# Patient Record
Sex: FEMALE | Race: WHITE | Employment: OTHER | URBAN - METROPOLITAN AREA
[De-identification: names, ages, dates, MRNs, and addresses within clinical notes are randomized per-mention and may not be internally consistent; named-entity substitution may affect disease eponyms.]

---

## 2017-10-23 PROBLEM — I10 ESSENTIAL HYPERTENSION: Chronic | Status: ACTIVE | Noted: 2017-10-23

## 2017-10-23 PROBLEM — Z96.611 S/P REVERSE TOTAL SHOULDER ARTHROPLASTY, RIGHT: Status: ACTIVE | Noted: 2017-10-23

## 2017-10-23 PROBLEM — L40.9 PSORIASIS: Chronic | Status: ACTIVE | Noted: 2017-10-23

## 2017-10-24 PROBLEM — D62 ACUTE BLOOD LOSS ANEMIA: Status: ACTIVE | Noted: 2017-10-24

## 2017-11-07 PROBLEM — I26.99 ACUTE PULMONARY EMBOLISM (HCC): Status: ACTIVE | Noted: 2017-11-07

## 2017-11-07 PROBLEM — I48.91 NEW ONSET ATRIAL FIBRILLATION (HCC): Status: ACTIVE | Noted: 2017-11-07

## 2017-11-09 PROBLEM — K92.2 GI BLEED: Status: ACTIVE | Noted: 2017-11-09

## 2017-12-03 PROBLEM — I82.A11 DVT OF AXILLARY VEIN, ACUTE RIGHT (HCC): Status: ACTIVE | Noted: 2017-12-03

## 2017-12-04 PROBLEM — K26.9 DUODENAL ULCER: Status: ACTIVE | Noted: 2017-11-09

## 2017-12-04 PROBLEM — I50.31 ACUTE DIASTOLIC CONGESTIVE HEART FAILURE (HCC): Status: ACTIVE | Noted: 2017-12-03

## 2017-12-04 PROBLEM — K26.9 DUODENAL ULCER: Chronic | Status: ACTIVE | Noted: 2017-11-09

## 2017-12-04 PROBLEM — M79.89 LEG SWELLING: Status: ACTIVE | Noted: 2017-12-04

## 2017-12-04 PROBLEM — D62 ACUTE BLOOD LOSS ANEMIA: Status: RESOLVED | Noted: 2017-10-24 | Resolved: 2017-12-04

## 2017-12-04 PROBLEM — D50.0 ANEMIA DUE TO BLOOD LOSS: Chronic | Status: ACTIVE | Noted: 2017-11-09

## 2017-12-04 PROBLEM — E53.8 B12 DEFICIENCY: Chronic | Status: ACTIVE | Noted: 2017-12-03

## 2017-12-04 PROBLEM — Z86.718 HISTORY OF DVT OF LOWER EXTREMITY: Chronic | Status: ACTIVE | Noted: 2017-11-07

## 2017-12-04 PROBLEM — D56.3 THALASSEMIA TRAIT: Chronic | Status: ACTIVE | Noted: 2017-12-03

## 2017-12-04 PROBLEM — I27.20 PULMONARY HYPERTENSION (HCC): Status: ACTIVE | Noted: 2017-12-04

## 2017-12-04 PROBLEM — Z95.828 S/P IVC FILTER: Chronic | Status: ACTIVE | Noted: 2017-11-09

## 2017-12-04 PROBLEM — K92.2 GASTROINTESTINAL HEMORRHAGE: Status: RESOLVED | Noted: 2017-11-09 | Resolved: 2017-12-04

## 2017-12-04 PROBLEM — Z86.711 HISTORY OF PULMONARY EMBOLISM: Chronic | Status: ACTIVE | Noted: 2017-11-07

## 2018-01-01 ENCOUNTER — TELEPHONE (OUTPATIENT)
Dept: SURGERY | Age: 78
End: 2018-01-01

## 2018-01-01 ENCOUNTER — HOSPITAL ENCOUNTER (EMERGENCY)
Age: 78
Discharge: HOME | End: 2018-07-24
Attending: EMERGENCY MEDICINE
Payer: MEDICARE

## 2018-01-01 ENCOUNTER — HOSPITAL ENCOUNTER (INPATIENT)
Age: 78
LOS: 2 days | Discharge: HOME | DRG: 394 | End: 2018-05-12
Attending: EMERGENCY MEDICINE | Admitting: INTERNAL MEDICINE
Payer: MEDICARE

## 2018-01-01 ENCOUNTER — HOSPITAL ENCOUNTER (OUTPATIENT)
Age: 78
Discharge: HOME | End: 2018-08-26

## 2018-01-01 ENCOUNTER — HOSPITAL ENCOUNTER (OUTPATIENT)
Dept: NUCLEAR MEDICINE | Age: 78
Discharge: HOME | End: 2018-12-29
Payer: MEDICARE

## 2018-01-01 ENCOUNTER — HOSPITAL ENCOUNTER (OUTPATIENT)
Dept: ULTRASOUND IMAGING | Age: 78
Discharge: HOME | End: 2018-07-26
Payer: MEDICARE

## 2018-01-01 ENCOUNTER — HOSPITAL ENCOUNTER (OUTPATIENT)
Age: 78
Setting detail: OUTPATIENT SURGERY
Discharge: HOME | End: 2018-12-13
Attending: SURGERY | Admitting: SURGERY
Payer: MEDICARE

## 2018-01-01 ENCOUNTER — APPOINTMENT (OUTPATIENT)
Dept: ULTRASOUND IMAGING | Age: 78
DRG: 394 | End: 2018-01-01
Payer: MEDICARE

## 2018-01-01 ENCOUNTER — OFFICE VISIT (OUTPATIENT)
Dept: SURGERY | Age: 78
End: 2018-01-01
Payer: MEDICARE

## 2018-01-01 ENCOUNTER — HOSPITAL ENCOUNTER (OUTPATIENT)
Dept: NUCLEAR MEDICINE | Age: 78
Discharge: HOME | End: 2018-12-30
Payer: MEDICARE

## 2018-01-01 ENCOUNTER — OFFICE VISIT (OUTPATIENT)
Dept: ORTHOPEDIC SURGERY | Age: 78
End: 2018-01-01
Payer: MEDICARE

## 2018-01-01 ENCOUNTER — ANESTHESIA (OUTPATIENT)
Dept: ENDOSCOPY | Age: 78
End: 2018-01-01
Payer: MEDICARE

## 2018-01-01 ENCOUNTER — ANESTHESIA EVENT (OUTPATIENT)
Dept: ENDOSCOPY | Age: 78
End: 2018-01-01
Payer: MEDICARE

## 2018-01-01 ENCOUNTER — TELEPHONE (OUTPATIENT)
Dept: ADMINISTRATIVE | Age: 78
End: 2018-01-01

## 2018-01-01 ENCOUNTER — HOSPITAL ENCOUNTER (OUTPATIENT)
Age: 78
Discharge: HOME | End: 2018-12-17
Payer: MEDICARE

## 2018-01-01 ENCOUNTER — HOSPITAL ENCOUNTER (OUTPATIENT)
Dept: CT IMAGING | Age: 78
Discharge: HOME | End: 2018-12-19
Payer: MEDICARE

## 2018-01-01 VITALS
BODY MASS INDEX: 23.6 KG/M2 | SYSTOLIC BLOOD PRESSURE: 100 MMHG | DIASTOLIC BLOOD PRESSURE: 55 MMHG | OXYGEN SATURATION: 98 % | TEMPERATURE: 98 F | WEIGHT: 125 LBS | HEIGHT: 61 IN | HEART RATE: 78 BPM | RESPIRATION RATE: 15 BRPM

## 2018-01-01 VITALS — DIASTOLIC BLOOD PRESSURE: 66 MMHG | SYSTOLIC BLOOD PRESSURE: 106 MMHG | OXYGEN SATURATION: 100 %

## 2018-01-01 VITALS
HEART RATE: 65 BPM | WEIGHT: 134.8 LBS | HEIGHT: 61 IN | SYSTOLIC BLOOD PRESSURE: 100 MMHG | TEMPERATURE: 98.3 F | DIASTOLIC BLOOD PRESSURE: 78 MMHG | BODY MASS INDEX: 25.45 KG/M2

## 2018-01-01 VITALS
HEART RATE: 80 BPM | BODY MASS INDEX: 28.37 KG/M2 | SYSTOLIC BLOOD PRESSURE: 108 MMHG | DIASTOLIC BLOOD PRESSURE: 60 MMHG | TEMPERATURE: 98 F | HEIGHT: 61 IN | RESPIRATION RATE: 18 BRPM | OXYGEN SATURATION: 98 % | WEIGHT: 150.3 LBS

## 2018-01-01 VITALS
SYSTOLIC BLOOD PRESSURE: 89 MMHG | WEIGHT: 125 LBS | HEART RATE: 88 BPM | OXYGEN SATURATION: 98 % | HEIGHT: 61 IN | TEMPERATURE: 98.6 F | DIASTOLIC BLOOD PRESSURE: 62 MMHG | BODY MASS INDEX: 23.6 KG/M2

## 2018-01-01 VITALS
DIASTOLIC BLOOD PRESSURE: 79 MMHG | HEIGHT: 61 IN | WEIGHT: 136 LBS | BODY MASS INDEX: 25.68 KG/M2 | TEMPERATURE: 98.1 F | SYSTOLIC BLOOD PRESSURE: 115 MMHG | HEART RATE: 93 BPM

## 2018-01-01 VITALS
WEIGHT: 145 LBS | BODY MASS INDEX: 27.38 KG/M2 | RESPIRATION RATE: 16 BRPM | HEIGHT: 61 IN | DIASTOLIC BLOOD PRESSURE: 73 MMHG | OXYGEN SATURATION: 96 % | TEMPERATURE: 98.3 F | HEART RATE: 80 BPM | SYSTOLIC BLOOD PRESSURE: 116 MMHG

## 2018-01-01 VITALS
SYSTOLIC BLOOD PRESSURE: 107 MMHG | BODY MASS INDEX: 23.6 KG/M2 | WEIGHT: 125 LBS | DIASTOLIC BLOOD PRESSURE: 77 MMHG | HEIGHT: 61 IN | HEART RATE: 96 BPM | TEMPERATURE: 98 F

## 2018-01-01 VITALS
HEART RATE: 90 BPM | BODY MASS INDEX: 27.38 KG/M2 | OXYGEN SATURATION: 98 % | TEMPERATURE: 97.6 F | WEIGHT: 145 LBS | HEIGHT: 61 IN | DIASTOLIC BLOOD PRESSURE: 80 MMHG | SYSTOLIC BLOOD PRESSURE: 120 MMHG

## 2018-01-01 DIAGNOSIS — M19.011 PRIMARY OSTEOARTHRITIS OF RIGHT SHOULDER: ICD-10-CM

## 2018-01-01 DIAGNOSIS — M25.511 CHRONIC RIGHT SHOULDER PAIN: ICD-10-CM

## 2018-01-01 DIAGNOSIS — D3A.8 NEUROENDOCRINE TUMOR: ICD-10-CM

## 2018-01-01 DIAGNOSIS — Z85.038 HISTORY OF COLON CANCER, STAGE I: Primary | ICD-10-CM

## 2018-01-01 DIAGNOSIS — R19.7 DIARRHEA, UNSPECIFIED TYPE: ICD-10-CM

## 2018-01-01 DIAGNOSIS — D3A.010 BENIGN CARCINOID TUMOR OF DUODENUM: ICD-10-CM

## 2018-01-01 DIAGNOSIS — R60.9 EDEMA, UNSPECIFIED TYPE: ICD-10-CM

## 2018-01-01 DIAGNOSIS — Z96.611 S/P REVERSE TOTAL SHOULDER ARTHROPLASTY, RIGHT: Primary | ICD-10-CM

## 2018-01-01 DIAGNOSIS — K74.60 HEPATIC CIRRHOSIS, UNSPECIFIED HEPATIC CIRRHOSIS TYPE, UNSPECIFIED WHETHER ASCITES PRESENT (HCC): ICD-10-CM

## 2018-01-01 DIAGNOSIS — G89.29 CHRONIC RIGHT SHOULDER PAIN: ICD-10-CM

## 2018-01-01 DIAGNOSIS — D3A.00 CARCINOID TUMOR: ICD-10-CM

## 2018-01-01 DIAGNOSIS — I82.402 DEEP VEIN THROMBOSIS (DVT) OF LEFT LOWER EXTREMITY, UNSPECIFIED CHRONICITY, UNSPECIFIED VEIN (HCC): Primary | ICD-10-CM

## 2018-01-01 DIAGNOSIS — K62.5 RECTAL BLEEDING: Primary | ICD-10-CM

## 2018-01-01 DIAGNOSIS — C18.3 MALIGNANT NEOPLASM OF HEPATIC FLEXURE (HCC): ICD-10-CM

## 2018-01-01 DIAGNOSIS — Z85.038 HISTORY OF COLON CANCER, STAGE I: ICD-10-CM

## 2018-01-01 DIAGNOSIS — C18.3 CARCINOMA OF HEPATIC FLEXURE (HCC): ICD-10-CM

## 2018-01-01 DIAGNOSIS — R79.89 LIVER FUNCTION TEST ABNORMALITY: ICD-10-CM

## 2018-01-01 LAB
ABO/RH: NORMAL
ALBUMIN SERPL-MCNC: 1.9 G/DL (ref 3.5–5.2)
ALBUMIN SERPL-MCNC: 2 G/DL (ref 3.5–5.2)
ALBUMIN SERPL-MCNC: 2.2 G/DL (ref 3.5–5.2)
ALBUMIN SERPL-MCNC: 2.9 G/DL (ref 3.5–5.2)
ALP BLD-CCNC: 108 U/L (ref 35–104)
ALP BLD-CCNC: 120 U/L (ref 35–104)
ALP BLD-CCNC: 121 U/L (ref 35–104)
ALP BLD-CCNC: 239 U/L (ref 35–104)
ALT SERPL-CCNC: 13 U/L (ref 0–32)
ALT SERPL-CCNC: 14 U/L (ref 0–32)
ALT SERPL-CCNC: 18 U/L (ref 0–32)
ALT SERPL-CCNC: 26 U/L (ref 0–32)
ANION GAP SERPL CALCULATED.3IONS-SCNC: 10 MMOL/L (ref 7–16)
ANION GAP SERPL CALCULATED.3IONS-SCNC: 11 MMOL/L (ref 7–16)
ANION GAP SERPL CALCULATED.3IONS-SCNC: 11 MMOL/L (ref 7–16)
ANION GAP SERPL CALCULATED.3IONS-SCNC: 12 MMOL/L (ref 7–16)
ANTIBODY SCREEN: NORMAL
APTT: 21.4 SEC (ref 24.5–35.1)
APTT: 26.4 SEC (ref 24.5–35.1)
AST SERPL-CCNC: 19 U/L (ref 0–31)
AST SERPL-CCNC: 20 U/L (ref 0–31)
AST SERPL-CCNC: 45 U/L (ref 0–31)
AST SERPL-CCNC: 59 U/L (ref 0–31)
BASOPHILS ABSOLUTE: 0.02 E9/L (ref 0–0.2)
BASOPHILS ABSOLUTE: 0.03 E9/L (ref 0–0.2)
BASOPHILS ABSOLUTE: 0.03 E9/L (ref 0–0.2)
BASOPHILS ABSOLUTE: 0.05 E9/L (ref 0–0.2)
BASOPHILS RELATIVE PERCENT: 0.3 % (ref 0–2)
BASOPHILS RELATIVE PERCENT: 0.4 % (ref 0–2)
BASOPHILS RELATIVE PERCENT: 0.4 % (ref 0–2)
BASOPHILS RELATIVE PERCENT: 0.8 % (ref 0–2)
BILIRUB SERPL-MCNC: 0.6 MG/DL (ref 0–1.2)
BILIRUB SERPL-MCNC: 0.6 MG/DL (ref 0–1.2)
BILIRUB SERPL-MCNC: 0.7 MG/DL (ref 0–1.2)
BILIRUB SERPL-MCNC: 0.9 MG/DL (ref 0–1.2)
BLOOD BANK DISPENSE STATUS: NORMAL
BLOOD BANK DISPENSE STATUS: NORMAL
BLOOD BANK PRODUCT CODE: NORMAL
BLOOD BANK PRODUCT CODE: NORMAL
BPU ID: NORMAL
BPU ID: NORMAL
BUN BLDV-MCNC: 11 MG/DL (ref 8–23)
BUN BLDV-MCNC: 12 MG/DL (ref 8–23)
BUN BLDV-MCNC: 13 MG/DL (ref 8–23)
BUN BLDV-MCNC: 17 MG/DL (ref 8–23)
BUN BLDV-MCNC: 8 MG/DL (ref 8–23)
CALCIUM SERPL-MCNC: 7.4 MG/DL (ref 8.6–10.2)
CALCIUM SERPL-MCNC: 7.5 MG/DL (ref 8.6–10.2)
CALCIUM SERPL-MCNC: 7.5 MG/DL (ref 8.6–10.2)
CALCIUM SERPL-MCNC: 8.1 MG/DL (ref 8.6–10.2)
CHLORIDE BLD-SCNC: 104 MMOL/L (ref 98–107)
CHLORIDE BLD-SCNC: 106 MMOL/L (ref 98–107)
CHLORIDE BLD-SCNC: 106 MMOL/L (ref 98–107)
CHLORIDE BLD-SCNC: 108 MMOL/L (ref 98–107)
CO2: 23 MMOL/L (ref 22–29)
CO2: 24 MMOL/L (ref 22–29)
CO2: 24 MMOL/L (ref 22–29)
CO2: 25 MMOL/L (ref 22–29)
CREAT SERPL-MCNC: 0.5 MG/DL (ref 0.5–1)
CREAT SERPL-MCNC: 0.5 MG/DL (ref 0.5–1)
CREAT SERPL-MCNC: 0.6 MG/DL (ref 0.5–1)
DESCRIPTION BLOOD BANK: NORMAL
DESCRIPTION BLOOD BANK: NORMAL
EOSINOPHILS ABSOLUTE: 0.04 E9/L (ref 0.05–0.5)
EOSINOPHILS ABSOLUTE: 0.05 E9/L (ref 0.05–0.5)
EOSINOPHILS ABSOLUTE: 0.07 E9/L (ref 0.05–0.5)
EOSINOPHILS ABSOLUTE: 0.11 E9/L (ref 0.05–0.5)
EOSINOPHILS RELATIVE PERCENT: 0.6 % (ref 0–6)
EOSINOPHILS RELATIVE PERCENT: 0.8 % (ref 0–6)
EOSINOPHILS RELATIVE PERCENT: 1.2 % (ref 0–6)
EOSINOPHILS RELATIVE PERCENT: 1.6 % (ref 0–6)
FECAL NEUTRAL FAT: NORMAL
FECAL SPLIT FATS: NORMAL
FERRITIN: 175 NG/ML
FOLATE: 17.3 NG/ML (ref 4.8–24.2)
GFR AFRICAN AMERICAN: >60
GFR NON-AFRICAN AMERICAN: >60 ML/MIN/1.73
GIARDIA ANTIGEN STOOL: NORMAL
GLUCOSE BLD-MCNC: 74 MG/DL (ref 74–109)
GLUCOSE BLD-MCNC: 75 MG/DL (ref 74–109)
GLUCOSE BLD-MCNC: 91 MG/DL (ref 74–109)
GLUCOSE BLD-MCNC: 95 MG/DL (ref 74–109)
HCT VFR BLD CALC: 25.3 % (ref 34–48)
HCT VFR BLD CALC: 27.3 % (ref 34–48)
HCT VFR BLD CALC: 27.3 % (ref 34–48)
HCT VFR BLD CALC: 27.8 % (ref 34–48)
HCT VFR BLD CALC: 30.5 % (ref 34–48)
HCT VFR BLD CALC: 32.7 % (ref 34–48)
HCT VFR BLD CALC: 32.8 % (ref 34–48)
HCT VFR BLD CALC: 35.5 % (ref 34–48)
HEMOGLOBIN: 10 G/DL (ref 11.5–15.5)
HEMOGLOBIN: 10.5 G/DL (ref 11.5–15.5)
HEMOGLOBIN: 10.8 G/DL (ref 11.5–15.5)
HEMOGLOBIN: 11.6 G/DL (ref 11.5–15.5)
HEMOGLOBIN: 8.4 G/DL (ref 11.5–15.5)
HEMOGLOBIN: 9 G/DL (ref 11.5–15.5)
HEMOGLOBIN: 9.1 G/DL (ref 11.5–15.5)
HEMOGLOBIN: 9.1 G/DL (ref 11.5–15.5)
IMMATURE GRANULOCYTES #: 0.01 E9/L
IMMATURE GRANULOCYTES #: 0.01 E9/L
IMMATURE GRANULOCYTES #: 0.02 E9/L
IMMATURE GRANULOCYTES #: 0.03 E9/L
IMMATURE GRANULOCYTES %: 0.1 % (ref 0–5)
IMMATURE GRANULOCYTES %: 0.2 % (ref 0–5)
IMMATURE GRANULOCYTES %: 0.3 % (ref 0–5)
IMMATURE GRANULOCYTES %: 0.5 % (ref 0–5)
IMMATURE RETIC FRACT: 28.5 % (ref 3–15.9)
INR BLD: 1.1
INR BLD: 1.4
IRON SATURATION: 34 % (ref 15–50)
IRON: 55 MCG/DL (ref 37–145)
LYMPHOCYTES ABSOLUTE: 2 E9/L (ref 1.5–4)
LYMPHOCYTES ABSOLUTE: 2.15 E9/L (ref 1.5–4)
LYMPHOCYTES ABSOLUTE: 2.25 E9/L (ref 1.5–4)
LYMPHOCYTES ABSOLUTE: 2.74 E9/L (ref 1.5–4)
LYMPHOCYTES RELATIVE PERCENT: 28.8 % (ref 20–42)
LYMPHOCYTES RELATIVE PERCENT: 34.2 % (ref 20–42)
LYMPHOCYTES RELATIVE PERCENT: 35.6 % (ref 20–42)
LYMPHOCYTES RELATIVE PERCENT: 40.5 % (ref 20–42)
MAGNESIUM: 1.4 MG/DL (ref 1.6–2.6)
MCH RBC QN AUTO: 22.3 PG (ref 26–35)
MCH RBC QN AUTO: 22.5 PG (ref 26–35)
MCH RBC QN AUTO: 22.5 PG (ref 26–35)
MCH RBC QN AUTO: 22.8 PG (ref 26–35)
MCHC RBC AUTO-ENTMCNC: 32 % (ref 32–34.5)
MCHC RBC AUTO-ENTMCNC: 32.7 % (ref 32–34.5)
MCHC RBC AUTO-ENTMCNC: 33.2 % (ref 32–34.5)
MCHC RBC AUTO-ENTMCNC: 33.3 % (ref 32–34.5)
MCV RBC AUTO: 67.4 FL (ref 80–99.9)
MCV RBC AUTO: 67.8 FL (ref 80–99.9)
MCV RBC AUTO: 68.1 FL (ref 80–99.9)
MCV RBC AUTO: 71.3 FL (ref 80–99.9)
MONOCYTES ABSOLUTE: 0.37 E9/L (ref 0.1–0.95)
MONOCYTES ABSOLUTE: 0.41 E9/L (ref 0.1–0.95)
MONOCYTES ABSOLUTE: 0.48 E9/L (ref 0.1–0.95)
MONOCYTES ABSOLUTE: 0.53 E9/L (ref 0.1–0.95)
MONOCYTES RELATIVE PERCENT: 6.1 % (ref 2–12)
MONOCYTES RELATIVE PERCENT: 6.2 % (ref 2–12)
MONOCYTES RELATIVE PERCENT: 7.1 % (ref 2–12)
MONOCYTES RELATIVE PERCENT: 7.6 % (ref 2–12)
NEUTROPHILS ABSOLUTE: 3.4 E9/L (ref 1.8–7.3)
NEUTROPHILS ABSOLUTE: 3.42 E9/L (ref 1.8–7.3)
NEUTROPHILS ABSOLUTE: 3.78 E9/L (ref 1.8–7.3)
NEUTROPHILS ABSOLUTE: 4.32 E9/L (ref 1.8–7.3)
NEUTROPHILS RELATIVE PERCENT: 50.3 % (ref 43–80)
NEUTROPHILS RELATIVE PERCENT: 56.6 % (ref 43–80)
NEUTROPHILS RELATIVE PERCENT: 57.5 % (ref 43–80)
NEUTROPHILS RELATIVE PERCENT: 62.3 % (ref 43–80)
PDW BLD-RTO: 18.2 FL (ref 11.5–15)
PDW BLD-RTO: 18.4 FL (ref 11.5–15)
PDW BLD-RTO: 18.5 FL (ref 11.5–15)
PDW BLD-RTO: 19.4 FL (ref 11.5–15)
PLATELET # BLD: 155 E9/L (ref 130–450)
PLATELET # BLD: 156 E9/L (ref 130–450)
PLATELET # BLD: 173 E9/L (ref 130–450)
PLATELET # BLD: 185 E9/L (ref 130–450)
PMV BLD AUTO: 11.1 FL (ref 7–12)
PMV BLD AUTO: 11.3 FL (ref 7–12)
PMV BLD AUTO: 11.4 FL (ref 7–12)
PMV BLD AUTO: ABNORMAL FL (ref 7–12)
POTASSIUM REFLEX MAGNESIUM: 2.7 MMOL/L (ref 3.5–5)
POTASSIUM REFLEX MAGNESIUM: 3.6 MMOL/L (ref 3.5–5)
POTASSIUM SERPL-SCNC: 4.7 MMOL/L (ref 3.5–5)
POTASSIUM SERPL-SCNC: 4.9 MMOL/L (ref 3.5–5)
PROTHROMBIN TIME: 12.7 SEC (ref 9.3–12.4)
PROTHROMBIN TIME: 16.1 SEC (ref 9.3–12.4)
RBC # BLD: 3.73 E12/L (ref 3.5–5.5)
RBC # BLD: 4.05 E12/L (ref 3.5–5.5)
RBC # BLD: 4.08 E12/L (ref 3.5–5.5)
RBC # BLD: 4.6 E12/L (ref 3.5–5.5)
RETIC HGB EQUIVALENT: 25.4 PG (ref 28.2–36.6)
RETICULOCYTE ABSOLUTE COUNT: 0.09 E12/L
RETICULOCYTE COUNT PCT: 2.1 % (ref 0.4–1.9)
ROTAVIRUS ANTIGEN: NORMAL
SODIUM BLD-SCNC: 138 MMOL/L (ref 132–146)
SODIUM BLD-SCNC: 140 MMOL/L (ref 132–146)
SODIUM BLD-SCNC: 142 MMOL/L (ref 132–146)
SODIUM BLD-SCNC: 144 MMOL/L (ref 132–146)
TOTAL IRON BINDING CAPACITY: 160 MCG/DL (ref 250–450)
TOTAL PROTEIN: 3.9 G/DL (ref 6.4–8.3)
TOTAL PROTEIN: 4.2 G/DL (ref 6.4–8.3)
TOTAL PROTEIN: 4.6 G/DL (ref 6.4–8.3)
TOTAL PROTEIN: 5.6 G/DL (ref 6.4–8.3)
VITAMIN B-12: 374 PG/ML (ref 211–946)
WBC # BLD: 6 E9/L (ref 4.5–11.5)
WBC # BLD: 6.6 E9/L (ref 4.5–11.5)
WBC # BLD: 6.8 E9/L (ref 4.5–11.5)
WBC # BLD: 6.9 E9/L (ref 4.5–11.5)
WHITE BLOOD CELLS (WBC), STOOL: NORMAL

## 2018-01-01 PROCEDURE — 3700000000 HC ANESTHESIA ATTENDED CARE: Performed by: SURGERY

## 2018-01-01 PROCEDURE — 85014 HEMATOCRIT: CPT

## 2018-01-01 PROCEDURE — 7100000010 HC PHASE II RECOVERY - FIRST 15 MIN: Performed by: SURGERY

## 2018-01-01 PROCEDURE — 93970 EXTREMITY STUDY: CPT

## 2018-01-01 PROCEDURE — 85730 THROMBOPLASTIN TIME PARTIAL: CPT

## 2018-01-01 PROCEDURE — 99222 1ST HOSP IP/OBS MODERATE 55: CPT | Performed by: SURGERY

## 2018-01-01 PROCEDURE — 85018 HEMOGLOBIN: CPT

## 2018-01-01 PROCEDURE — G0105 COLORECTAL SCRN; HI RISK IND: HCPCS | Performed by: SURGERY

## 2018-01-01 PROCEDURE — 99213 OFFICE O/P EST LOW 20 MIN: CPT | Performed by: ORTHOPAEDIC SURGERY

## 2018-01-01 PROCEDURE — 2709999900 HC NON-CHARGEABLE SUPPLY: Performed by: SURGERY

## 2018-01-01 PROCEDURE — G8987 SELF CARE CURRENT STATUS: HCPCS

## 2018-01-01 PROCEDURE — 89055 LEUKOCYTE ASSESSMENT FECAL: CPT

## 2018-01-01 PROCEDURE — 85025 COMPLETE CBC W/AUTO DIFF WBC: CPT

## 2018-01-01 PROCEDURE — 85610 PROTHROMBIN TIME: CPT

## 2018-01-01 PROCEDURE — 86901 BLOOD TYPING SEROLOGIC RH(D): CPT

## 2018-01-01 PROCEDURE — 3700000001 HC ADD 15 MINUTES (ANESTHESIA): Performed by: SURGERY

## 2018-01-01 PROCEDURE — 86923 COMPATIBILITY TEST ELECTRIC: CPT

## 2018-01-01 PROCEDURE — 80053 COMPREHEN METABOLIC PANEL: CPT

## 2018-01-01 PROCEDURE — 83550 IRON BINDING TEST: CPT

## 2018-01-01 PROCEDURE — G0378 HOSPITAL OBSERVATION PER HR: HCPCS

## 2018-01-01 PROCEDURE — 83735 ASSAY OF MAGNESIUM: CPT

## 2018-01-01 PROCEDURE — 96375 TX/PRO/DX INJ NEW DRUG ADDON: CPT

## 2018-01-01 PROCEDURE — 2580000003 HC RX 258: Performed by: INTERNAL MEDICINE

## 2018-01-01 PROCEDURE — 86850 RBC ANTIBODY SCREEN: CPT

## 2018-01-01 PROCEDURE — 3430000000 HC RX DIAGNOSTIC RADIOPHARMACEUTICAL: Performed by: RADIOLOGY

## 2018-01-01 PROCEDURE — 2580000003 HC RX 258: Performed by: NURSE ANESTHETIST, CERTIFIED REGISTERED

## 2018-01-01 PROCEDURE — 96365 THER/PROPH/DIAG IV INF INIT: CPT

## 2018-01-01 PROCEDURE — 99213 OFFICE O/P EST LOW 20 MIN: CPT | Performed by: SURGERY

## 2018-01-01 PROCEDURE — 6370000000 HC RX 637 (ALT 250 FOR IP): Performed by: SURGERY

## 2018-01-01 PROCEDURE — 99285 EMERGENCY DEPT VISIT HI MDM: CPT

## 2018-01-01 PROCEDURE — 82728 ASSAY OF FERRITIN: CPT

## 2018-01-01 PROCEDURE — 3609009500 HC COLONOSCOPY DIAGNOSTIC OR SCREENING: Performed by: SURGERY

## 2018-01-01 PROCEDURE — A9572 INDIUM IN-111 PENTETREOTIDE: HCPCS | Performed by: RADIOLOGY

## 2018-01-01 PROCEDURE — 1200000000 HC SEMI PRIVATE

## 2018-01-01 PROCEDURE — 99232 SBSQ HOSP IP/OBS MODERATE 35: CPT | Performed by: SURGERY

## 2018-01-01 PROCEDURE — G8979 MOBILITY GOAL STATUS: HCPCS

## 2018-01-01 PROCEDURE — G8988 SELF CARE GOAL STATUS: HCPCS

## 2018-01-01 PROCEDURE — G8978 MOBILITY CURRENT STATUS: HCPCS

## 2018-01-01 PROCEDURE — 85045 AUTOMATED RETICULOCYTE COUNT: CPT

## 2018-01-01 PROCEDURE — 2580000003 HC RX 258: Performed by: EMERGENCY MEDICINE

## 2018-01-01 PROCEDURE — 7100000011 HC PHASE II RECOVERY - ADDTL 15 MIN: Performed by: SURGERY

## 2018-01-01 PROCEDURE — 6370000000 HC RX 637 (ALT 250 FOR IP): Performed by: INTERNAL MEDICINE

## 2018-01-01 PROCEDURE — 6360000002 HC RX W HCPCS: Performed by: INTERNAL MEDICINE

## 2018-01-01 PROCEDURE — 87329 GIARDIA AG IA: CPT

## 2018-01-01 PROCEDURE — 97161 PT EVAL LOW COMPLEX 20 MIN: CPT

## 2018-01-01 PROCEDURE — 88305 TISSUE EXAM BY PATHOLOGIST: CPT

## 2018-01-01 PROCEDURE — 82607 VITAMIN B-12: CPT

## 2018-01-01 PROCEDURE — 78800 RP LOCLZJ TUM 1 AREA 1 D IMG: CPT

## 2018-01-01 PROCEDURE — 97165 OT EVAL LOW COMPLEX 30 MIN: CPT

## 2018-01-01 PROCEDURE — 6360000002 HC RX W HCPCS: Performed by: NURSE ANESTHETIST, CERTIFIED REGISTERED

## 2018-01-01 PROCEDURE — 78803 RP LOCLZJ TUM SPECT 1 AREA: CPT

## 2018-01-01 PROCEDURE — 87425 ROTAVIRUS AG IA: CPT

## 2018-01-01 PROCEDURE — 82746 ASSAY OF FOLIC ACID SERUM: CPT

## 2018-01-01 PROCEDURE — 6360000004 HC RX CONTRAST MEDICATION: Performed by: RADIOLOGY

## 2018-01-01 PROCEDURE — 82565 ASSAY OF CREATININE: CPT

## 2018-01-01 PROCEDURE — 36415 COLL VENOUS BLD VENIPUNCTURE: CPT

## 2018-01-01 PROCEDURE — 84520 ASSAY OF UREA NITROGEN: CPT

## 2018-01-01 PROCEDURE — 82705 FATS/LIPIDS FECES QUAL: CPT

## 2018-01-01 PROCEDURE — 74177 CT ABD & PELVIS W/CONTRAST: CPT

## 2018-01-01 PROCEDURE — 93971 EXTREMITY STUDY: CPT

## 2018-01-01 PROCEDURE — 99283 EMERGENCY DEPT VISIT LOW MDM: CPT

## 2018-01-01 PROCEDURE — 96376 TX/PRO/DX INJ SAME DRUG ADON: CPT

## 2018-01-01 PROCEDURE — 83540 ASSAY OF IRON: CPT

## 2018-01-01 PROCEDURE — 88342 IMHCHEM/IMCYTCHM 1ST ANTB: CPT

## 2018-01-01 PROCEDURE — 86900 BLOOD TYPING SEROLOGIC ABO: CPT

## 2018-01-01 PROCEDURE — 96366 THER/PROPH/DIAG IV INF ADDON: CPT

## 2018-01-01 RX ORDER — FUROSEMIDE 10 MG/ML
40 INJECTION INTRAMUSCULAR; INTRAVENOUS DAILY
Status: DISCONTINUED | OUTPATIENT
Start: 2018-01-01 | End: 2018-01-01 | Stop reason: HOSPADM

## 2018-01-01 RX ORDER — PANTOPRAZOLE SODIUM 40 MG/1
40 TABLET, DELAYED RELEASE ORAL DAILY
Status: DISCONTINUED | OUTPATIENT
Start: 2018-01-01 | End: 2018-01-01 | Stop reason: HOSPADM

## 2018-01-01 RX ORDER — 0.9 % SODIUM CHLORIDE 0.9 %
500 INTRAVENOUS SOLUTION INTRAVENOUS ONCE
Status: COMPLETED | OUTPATIENT
Start: 2018-01-01 | End: 2018-01-01

## 2018-01-01 RX ORDER — SODIUM CHLORIDE 0.9 % (FLUSH) 0.9 %
10 SYRINGE (ML) INJECTION ONCE
Status: DISCONTINUED | OUTPATIENT
Start: 2018-01-01 | End: 2018-01-01 | Stop reason: HOSPADM

## 2018-01-01 RX ORDER — 0.9 % SODIUM CHLORIDE 0.9 %
10 VIAL (ML) INJECTION PRN
Status: DISCONTINUED | OUTPATIENT
Start: 2018-01-01 | End: 2018-01-01 | Stop reason: HOSPADM

## 2018-01-01 RX ORDER — ONDANSETRON 2 MG/ML
4 INJECTION INTRAMUSCULAR; INTRAVENOUS EVERY 6 HOURS PRN
Status: DISCONTINUED | OUTPATIENT
Start: 2018-01-01 | End: 2018-01-01 | Stop reason: HOSPADM

## 2018-01-01 RX ORDER — SODIUM CHLORIDE 0.9 % (FLUSH) 0.9 %
10 SYRINGE (ML) INJECTION PRN
Status: DISCONTINUED | OUTPATIENT
Start: 2018-01-01 | End: 2018-01-01 | Stop reason: HOSPADM

## 2018-01-01 RX ORDER — 0.9 % SODIUM CHLORIDE 0.9 %
10 VIAL (ML) INJECTION EVERY 12 HOURS SCHEDULED
Status: DISCONTINUED | OUTPATIENT
Start: 2018-01-01 | End: 2018-01-01 | Stop reason: HOSPADM

## 2018-01-01 RX ORDER — ONDANSETRON 4 MG/1
4 TABLET, FILM COATED ORAL EVERY 6 HOURS PRN
Status: ON HOLD | COMMUNITY
End: 2019-01-01 | Stop reason: HOSPADM

## 2018-01-01 RX ORDER — 0.9 % SODIUM CHLORIDE 0.9 %
250 INTRAVENOUS SOLUTION INTRAVENOUS ONCE
Status: COMPLETED | OUTPATIENT
Start: 2018-01-01 | End: 2018-01-01

## 2018-01-01 RX ORDER — DOCUSATE SODIUM 100 MG/1
100 CAPSULE, LIQUID FILLED ORAL 2 TIMES DAILY
COMMUNITY
End: 2018-01-01 | Stop reason: ALTCHOICE

## 2018-01-01 RX ORDER — CALCIUM POLYCARBOPHIL 625 MG 625 MG/1
625 TABLET ORAL DAILY
Refills: 4 | COMMUNITY
Start: 2018-01-01

## 2018-01-01 RX ORDER — SODIUM CHLORIDE 9 MG/ML
INJECTION, SOLUTION INTRAVENOUS CONTINUOUS PRN
Status: DISCONTINUED | OUTPATIENT
Start: 2018-01-01 | End: 2018-01-01 | Stop reason: SDUPTHER

## 2018-01-01 RX ORDER — WARFARIN SODIUM 5 MG/1
5 TABLET ORAL DAILY
Status: ON HOLD | COMMUNITY
End: 2019-01-01 | Stop reason: HOSPADM

## 2018-01-01 RX ORDER — SODIUM CHLORIDE 0.9 % (FLUSH) 0.9 %
10 SYRINGE (ML) INJECTION EVERY 12 HOURS SCHEDULED
Status: DISCONTINUED | OUTPATIENT
Start: 2018-01-01 | End: 2018-01-01 | Stop reason: HOSPADM

## 2018-01-01 RX ORDER — POTASSIUM CHLORIDE 20 MEQ/1
40 TABLET, EXTENDED RELEASE ORAL ONCE
Status: COMPLETED | OUTPATIENT
Start: 2018-01-01 | End: 2018-01-01

## 2018-01-01 RX ORDER — CHOLESTYRAMINE 4 G/9G
POWDER, FOR SUSPENSION ORAL
COMMUNITY
Start: 2018-01-01

## 2018-01-01 RX ORDER — LOPERAMIDE HYDROCHLORIDE 2 MG/1
2 CAPSULE ORAL 4 TIMES DAILY PRN
COMMUNITY

## 2018-01-01 RX ORDER — MAGNESIUM SULFATE IN WATER 40 MG/ML
2 INJECTION, SOLUTION INTRAVENOUS ONCE
Status: COMPLETED | OUTPATIENT
Start: 2018-01-01 | End: 2018-01-01

## 2018-01-01 RX ORDER — SODIUM CHLORIDE AND POTASSIUM CHLORIDE .9; .15 G/100ML; G/100ML
SOLUTION INTRAVENOUS CONTINUOUS
Status: DISCONTINUED | OUTPATIENT
Start: 2018-01-01 | End: 2018-01-01

## 2018-01-01 RX ORDER — DIPHENOXYLATE HYDROCHLORIDE AND ATROPINE SULFATE 2.5; .025 MG/1; MG/1
1 TABLET ORAL 4 TIMES DAILY PRN
Qty: 90 TABLET | Refills: 5 | Status: SHIPPED | OUTPATIENT
Start: 2018-01-01 | End: 2018-01-01 | Stop reason: SDUPTHER

## 2018-01-01 RX ORDER — POTASSIUM CHLORIDE 750 MG/1
10 TABLET, EXTENDED RELEASE ORAL DAILY
Status: DISCONTINUED | OUTPATIENT
Start: 2018-01-01 | End: 2018-01-01 | Stop reason: HOSPADM

## 2018-01-01 RX ORDER — MONTELUKAST SODIUM 10 MG/1
10 TABLET ORAL NIGHTLY
Status: DISCONTINUED | OUTPATIENT
Start: 2018-01-01 | End: 2018-01-01 | Stop reason: HOSPADM

## 2018-01-01 RX ORDER — DIPHENOXYLATE HYDROCHLORIDE AND ATROPINE SULFATE 2.5; .025 MG/1; MG/1
1 TABLET ORAL 4 TIMES DAILY PRN
Qty: 90 TABLET | Refills: 5 | Status: SHIPPED | OUTPATIENT
Start: 2018-01-01 | End: 2018-01-01

## 2018-01-01 RX ORDER — CALCIUM POLYCARBOPHIL 625 MG 625 MG/1
625 TABLET ORAL DAILY
Status: DISCONTINUED | OUTPATIENT
Start: 2018-01-01 | End: 2018-01-01 | Stop reason: HOSPADM

## 2018-01-01 RX ORDER — PROPOFOL 10 MG/ML
INJECTION, EMULSION INTRAVENOUS PRN
Status: DISCONTINUED | OUTPATIENT
Start: 2018-01-01 | End: 2018-01-01 | Stop reason: SDUPTHER

## 2018-01-01 RX ADMIN — FUROSEMIDE 40 MG: 10 INJECTION, SOLUTION INTRAMUSCULAR; INTRAVENOUS at 14:23

## 2018-01-01 RX ADMIN — SODIUM CHLORIDE: 9 INJECTION, SOLUTION INTRAVENOUS at 07:46

## 2018-01-01 RX ADMIN — PROPOFOL 100 MG: 10 INJECTION, EMULSION INTRAVENOUS at 07:48

## 2018-01-01 RX ADMIN — PANTOPRAZOLE SODIUM 40 MG: 40 TABLET, DELAYED RELEASE ORAL at 09:27

## 2018-01-01 RX ADMIN — INDIUM IN -111 PENTETREOTIDE 7.5 MILLICURIE: KIT at 08:29

## 2018-01-01 RX ADMIN — METOPROLOL TARTRATE 25 MG: 25 TABLET ORAL at 09:33

## 2018-01-01 RX ADMIN — METOPROLOL TARTRATE 25 MG: 25 TABLET ORAL at 20:51

## 2018-01-01 RX ADMIN — MINERAL OIL, PETROLATUM, PHENYLEPHRINE HCL: 2.5; 140; 749 OINTMENT TOPICAL at 08:20

## 2018-01-01 RX ADMIN — SODIUM CHLORIDE 250 ML: 9 INJECTION, SOLUTION INTRAVENOUS at 17:05

## 2018-01-01 RX ADMIN — Medication 10 ML: at 09:33

## 2018-01-01 RX ADMIN — SODIUM CHLORIDE 500 ML: 9 INJECTION, SOLUTION INTRAVENOUS at 15:20

## 2018-01-01 RX ADMIN — METOPROLOL TARTRATE 25 MG: 25 TABLET ORAL at 08:20

## 2018-01-01 RX ADMIN — MAGNESIUM SULFATE HEPTAHYDRATE 2 G: 40 INJECTION, SOLUTION INTRAVENOUS at 09:27

## 2018-01-01 RX ADMIN — Medication 10 ML: at 20:53

## 2018-01-01 RX ADMIN — MINERAL OIL, PETROLATUM, PHENYLEPHRINE HCL: 2.5; 140; 749 OINTMENT TOPICAL at 19:13

## 2018-01-01 RX ADMIN — IOHEXOL 50 ML: 240 INJECTION, SOLUTION INTRATHECAL; INTRAVASCULAR; INTRAVENOUS; ORAL at 14:23

## 2018-01-01 RX ADMIN — Medication 10 ML: at 08:20

## 2018-01-01 RX ADMIN — CALCIUM POLYCARBOPHIL 625 MG: 625 TABLET, FILM COATED ORAL at 14:23

## 2018-01-01 RX ADMIN — METOPROLOL TARTRATE 25 MG: 25 TABLET ORAL at 20:53

## 2018-01-01 RX ADMIN — CALCIUM POLYCARBOPHIL 625 MG: 625 TABLET, FILM COATED ORAL at 08:20

## 2018-01-01 RX ADMIN — Medication 10 ML: at 20:52

## 2018-01-01 RX ADMIN — IOPAMIDOL 110 ML: 755 INJECTION, SOLUTION INTRAVENOUS at 14:23

## 2018-01-01 RX ADMIN — POTASSIUM CHLORIDE 40 MEQ: 20 TABLET, EXTENDED RELEASE ORAL at 14:23

## 2018-01-01 RX ADMIN — MONTELUKAST SODIUM 10 MG: 10 TABLET, FILM COATED ORAL at 20:53

## 2018-01-01 RX ADMIN — MINERAL OIL, PETROLATUM, PHENYLEPHRINE HCL: 2.5; 140; 749 OINTMENT TOPICAL at 20:52

## 2018-01-01 RX ADMIN — FUROSEMIDE 40 MG: 10 INJECTION, SOLUTION INTRAMUSCULAR; INTRAVENOUS at 08:20

## 2018-01-01 RX ADMIN — MONTELUKAST SODIUM 10 MG: 10 TABLET, FILM COATED ORAL at 20:52

## 2018-01-01 RX ADMIN — POTASSIUM CHLORIDE 10 MEQ: 10 TABLET, EXTENDED RELEASE ORAL at 08:20

## 2018-01-01 RX ADMIN — PANTOPRAZOLE SODIUM 40 MG: 40 TABLET, DELAYED RELEASE ORAL at 08:20

## 2018-01-01 RX ADMIN — POTASSIUM CHLORIDE 40 MEQ: 20 TABLET, EXTENDED RELEASE ORAL at 06:29

## 2018-01-01 ASSESSMENT — PAIN SCALES - GENERAL
PAINLEVEL_OUTOF10: 0

## 2018-01-01 ASSESSMENT — LIFESTYLE VARIABLES: SMOKING_STATUS: 0

## 2018-01-01 ASSESSMENT — PAIN - FUNCTIONAL ASSESSMENT: PAIN_FUNCTIONAL_ASSESSMENT: 0-10

## 2018-01-11 PROBLEM — I82.441 ACUTE DEEP VEIN THROMBOSIS (DVT) OF TIBIAL VEIN OF RIGHT LOWER EXTREMITY (HCC): Chronic | Status: ACTIVE | Noted: 2017-11-07

## 2018-01-11 PROBLEM — I82.A11 DVT OF AXILLARY VEIN, ACUTE RIGHT (HCC): Status: RESOLVED | Noted: 2017-12-03 | Resolved: 2018-01-11

## 2018-01-24 PROBLEM — C18.2 PRIMARY ADENOCARCINOMA OF ASCENDING COLON (HCC): Status: ACTIVE | Noted: 2018-01-24

## 2018-02-09 PROBLEM — I48.19 PERSISTENT ATRIAL FIBRILLATION (HCC): Status: ACTIVE | Noted: 2017-11-07

## 2018-03-06 PROBLEM — E87.6 HYPOKALEMIA: Status: ACTIVE | Noted: 2018-03-06

## 2018-03-07 PROBLEM — I82.541 CHRONIC DEEP VEIN THROMBOSIS (DVT) OF TIBIAL VEIN OF RIGHT LOWER EXTREMITY (HCC): Chronic | Status: ACTIVE | Noted: 2017-11-07

## 2018-03-07 PROBLEM — E87.6 HYPOKALEMIA: Status: RESOLVED | Noted: 2018-03-06 | Resolved: 2018-03-07

## 2018-03-07 PROBLEM — I27.82 CHRONIC PULMONARY EMBOLISM (HCC): Status: ACTIVE | Noted: 2017-11-07

## 2018-04-11 ENCOUNTER — OFFICE VISIT (OUTPATIENT)
Dept: CARDIOLOGY CLINIC | Age: 78
End: 2018-04-11
Payer: MEDICARE

## 2018-04-11 VITALS
BODY MASS INDEX: 26.24 KG/M2 | HEART RATE: 76 BPM | WEIGHT: 139 LBS | HEIGHT: 61 IN | DIASTOLIC BLOOD PRESSURE: 60 MMHG | SYSTOLIC BLOOD PRESSURE: 112 MMHG | RESPIRATION RATE: 14 BRPM

## 2018-04-11 DIAGNOSIS — I47.29 NSVT (NONSUSTAINED VENTRICULAR TACHYCARDIA): Primary | ICD-10-CM

## 2018-04-11 PROCEDURE — 99213 OFFICE O/P EST LOW 20 MIN: CPT | Performed by: INTERNAL MEDICINE

## 2018-04-11 PROCEDURE — 93000 ELECTROCARDIOGRAM COMPLETE: CPT | Performed by: INTERNAL MEDICINE

## 2018-05-10 PROBLEM — K92.2 GI BLEED: Status: ACTIVE | Noted: 2018-01-01

## 2018-05-11 PROBLEM — I48.91 ATRIAL FIBRILLATION (HCC): Status: RESOLVED | Noted: 2017-11-07 | Resolved: 2018-01-01

## 2018-05-11 PROBLEM — M79.89 LEG SWELLING: Status: RESOLVED | Noted: 2017-12-04 | Resolved: 2018-01-01

## 2018-05-11 PROBLEM — I48.91 ATRIAL FIBRILLATION (HCC): Status: ACTIVE | Noted: 2017-11-07

## 2018-05-11 PROBLEM — Z87.11 HISTORY OF PEPTIC ULCER DISEASE: Status: ACTIVE | Noted: 2017-11-09

## 2018-05-11 PROBLEM — I50.31 ACUTE DIASTOLIC CONGESTIVE HEART FAILURE (HCC): Status: RESOLVED | Noted: 2017-12-03 | Resolved: 2018-01-01

## 2018-07-03 NOTE — PROGRESS NOTES
Follow Up Visit     Lupe Smith returns today for follow up visit regarding Her right reverse shoulder arthroplasty. This was done in October 2017. She has had many medical issues since that time requiring several readmissions. Ultimately she was found to have gastric cancer. She underwent resection of portions of her stomach and intestine. She has been stable from this for the past few months. Her shoulder has stiffened up on her. She was not able to do much physical therapy. Physical Exam:     Height: 5' 1\" (1.549 m), Weight: 136 lb (61.7 kg), BP: 115/79    On exam, passive elevation is approximately 60°. External rotation is to neutral.  Internal rotation is the left hip. She is able to hold her arm in 45° abduction and empty can position. Incision is intact. Controlled Substances Monitoring:      Imaging:  X-rays of the right shoulder 3 views were obtained. These show good alignment of her right shoulder reverse replacement, cemented. No signs of fracture or loosening. Impression: Good alignment of right reverse shoulder arthroplasty      Assessment: She is now about 9 months out from right reverse total shoulder replacement      Plan:   We discussed her shoulder today. She is fairly stiff. Her motion is better than prior to surgery. She suffered several unfortunate readmissions and ultimately underwent surgery for gastric cancer since her shoulder replacement. She has not had a good period of physical therapy that was not  interrupted by medical issues. Further, she had very poor motion prior to surgery due to posttraumatic arthritis. We discussed that she may not get much more in terms of motion. She is interested in trying some physical therapy. She was given a prescription today. We will see her back in 6 weeks to reassess.     Adriana Zamorano MD  Orthopaedic Surgery   7/3/18  2:12 PM

## 2018-07-24 NOTE — ED NOTES
FIRST PROVIDER CONTACT ASSESSMENT NOTE      Department of Emergency Medicine   Admit Date: No admission date for patient encounter. Chief Complaint: No chief complaint on file. History of Present Illness:    Roderick Wang is a 69 y.o. female who presents to the ED for LLE edema with a known DVT in the LLE. No SOB, chest pain or hemoptysis.         -----------------END OF FIRST PROVIDER CONTACT ASSESSMENT NOTE--------------  Electronically signed by THALIA Yepez   DD: 7/24/18               Anuja Toussaint [place]  07/24/18 3421

## 2018-07-24 NOTE — ED PROVIDER NOTES
HISTORY OF PRESENT ILLNESS:  (Nurses Notes Reviewed)    Chief Complaint:  Other (PT has confirmed DVT of left lower extremity. Femoral vein to popliteal vein. Denies SOB, CP, or hemoptysis)         Millie Russell is a 69 y.o. old female presenting to the emergency department for positive DVT study in the left lower extremity. There was an outpatient basis today. The patient has history of recurrent DVT in the past and underwent coagulation treatment and have an inferior vena cava filter was placed and removed. The patient denied any shortness of breath, cough or hemoptysis. No exertional dyspnea and fatigue    REVIEW OF SYSTEMS    Pertinent positives and negatives are stated within the HPI, all other systems reviewed and are negative. At least 10 systems reviewed with the patient and pertinent negative symptoms listed below. Consitutional: No fever chills change or unexplained weight loss. Eye: No vision changes or diplopia  ENT: No dysphagia or ear drainage  Head: No head injury, rashes or headaches  Neck No neck rigidity deformity or masses  Lungs: No cough wheezing or hemoptysis  Heart: no palpitation diaphoresis or syncope  Abdomen No Abdominal pain or hematemesis. Genitourinary: No discharge or genital lesions reported. Neurologic: No weakness numbness or gait abnormalities. Psychiatric; No hallucination, delusion homicidal or suicidal thoughts. Physical Exam:    General:  No acute distress noted. Patient is well nourished and well developed. Vital signs and nurses assesement reviewed. Head: Normocephalic, atraumatic, oral exam showed no abnormalities. Eyes: PERRLA, EOMI, No scleral icterus or papliedema. Neck: No thyroid masses, carotid bruits, tracheal deviation or significant adenopathy. Lungs: Clear to auscultation bilaterally. No rales, rhonchi or pleural rubs. Heart: Normal PMI, No gallop, murmurs, rubs or abnormal heart sounds.     Abdomen: Soft without tenderness rigidity, rebound tenderness, organ enlargement or masses. Normal bowel sounds activity. Extremities: Lateral lower extremity edema more market the left side with redness and tenderness of the calf     Neurologic: Awake and alert, with normal speech and comprehension. Cranial nerve grossly intact, normal muscle power, tone and deep tendon reflexes. No sensory loss. Normal gait and co-ordination. Skin: No rashes, ulcers, cyanosis or pallor. Capillary refill normal.          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 Past Surgical History:  has a past surgical history that includes Tonsillectomy; Breast lumpectomy (Right); Breast lumpectomy (Left); Total shoulder arthroplasty (Right, 10/23/2017); Upper gastrointestinal endoscopy (11/09/2017); Vena Cava Filter Placement (11/09/2017); Upper gastrointestinal endoscopy (12/28/2017); Colonoscopy (12/28/2017); and Esophageal varice ligation (12/28/2017). Social History:  reports that she quit smoking about 20 years ago. Her smoking use included Cigarettes. She quit after 20.00 years of use. She has never used smokeless tobacco. She reports that she does not drink alcohol or use drugs.     Family History: family history

## 2018-07-25 NOTE — ED NOTES
Discharge instructions, prescription and follow up explained. Patient verbalizes understanding.       Walker Luis RN  07/24/18 1661

## 2018-07-29 NOTE — PROGRESS NOTES
General Surgery Clinic Note      Assessment/Plan:     Diagnosis Orders   1. History of colon cancer, stage I  CEA    Check CEA. Repeat colonoscopy in 5 months. Guaiac negative, okay to resume Eliquis for DVT   2. Benign carcinoid tumor of duodenum  MISCELLANEOUS SENDOUT 1    Check Chromogranin. Repeat EGD at time of colonoscopy. Return in about 3 months (around 10/26/2018). Chief Complaint   Patient presents with    Other     DVT in left leg       HPI: Jennifer Ochoa is a 69 y.o. female here for follow-up of colectomy and wedge duodenal excision for stage I colon cancer and duodenal carcinoid respectively. She's been doing well from a GI standpoint. She started eating more Once we started her on Marinol. Bowels are moving. She has had some episodes of incontinence. We discussed increasing fiber to bulk up her stools. She had a new DVT, recently diagnosed and needs to resume her anticoagulation. She's had no blood or melena. Review of Systems   All other systems reviewed and are negative. Objective:  Vitals:    07/26/18 1446   BP: 120/80   Site: Right Arm   Position: Sitting   Cuff Size: Medium Adult   Pulse: 90   Temp: 97.6 °F (36.4 °C)   TempSrc: Oral   SpO2: 98%   Weight: 145 lb (65.8 kg)   Height: 5' 1\" (1.549 m)          Physical Exam   Constitutional: She is oriented to person, place, and time. No distress. HENT:   Head: Normocephalic and atraumatic. Eyes: Right eye exhibits no discharge. Left eye exhibits no discharge. Neck: No tracheal deviation present. Cardiovascular: Normal rate. Pulmonary/Chest: Effort normal. No respiratory distress. Abdominal: Soft. She exhibits no distension. There is no tenderness. There is no rebound and no guarding. Genitourinary: Rectal exam shows guaiac negative stool. Genitourinary Comments: Large external and internal hemorrhoids   Neurological: She is alert and oriented to person, place, and time. Skin: Skin is warm and dry.  She is not diaphoretic. Psychiatric: Affect normal.                   Mya Walton MD  7/29/2018    NOTE: This report, in part or full, may have been transcribed using voice recognition software. Every effort was made to ensure accuracy; however, inadvertent computerized transcription errors may be present. Please excuse any transcriptional grammatical or spelling errors that may have escaped my editorial review.

## 2018-11-12 NOTE — TELEPHONE ENCOUNTER
Patient was called and scheduled for her colonoscopy. Patient is aware of date and time of procedure.

## 2018-11-27 NOTE — PROGRESS NOTES
Post Operative Visit     Surgery: right shoulder reverse total shoulder arthroplasty   Date: 10/23/17    Subjective:    Khurram Clark is here for follow up visit s/p above procedure. She is doing well. She has no pain    Controlled Substances Monitoring:        Physical Exam:    Height: 5' 1\" (1.549 m), Weight: 125 lb (56.7 kg), BP: 107/77    On exam, her incision is healed. There is no swelling. She can forward elevate approximately 90°. She can internally rotate to her lumbar spine area. External rotation is only about minus 5°. Imaging: x-rays of the right shoulder including 3 view show good alignment of her reverse shoulder replacement    Assessment and Plan:  1 year status post reverse shoulder replacement on the right side due to chronic posttraumatic arthritis/nonunion  We discussed her shoulder today. Fortunately she is having no pain, but she has not quite regained the motion that she had hoped. We discussed that she had very little motion prior to surgery due to her chronic shoulder injury which had not moved much for many years. She understands. She is overall happy with her situation.   We will see her back on an as-needed basis    Andres Martinez MD  Orthopaedic Surgery   11/27/18  12:10 PM

## 2019-01-01 ENCOUNTER — APPOINTMENT (OUTPATIENT)
Dept: GENERAL RADIOLOGY | Age: 79
DRG: 480 | End: 2019-01-01
Payer: MEDICARE

## 2019-01-01 ENCOUNTER — HOSPITAL ENCOUNTER (INPATIENT)
Age: 79
LOS: 5 days | Discharge: NURSING, REHAB OR LONG-TERM CARE | DRG: 480 | End: 2019-04-18
Attending: EMERGENCY MEDICINE | Admitting: INTERNAL MEDICINE
Payer: MEDICARE

## 2019-01-01 ENCOUNTER — APPOINTMENT (OUTPATIENT)
Dept: CT IMAGING | Age: 79
End: 2019-01-01
Payer: MEDICARE

## 2019-01-01 ENCOUNTER — ANESTHESIA EVENT (OUTPATIENT)
Dept: OPERATING ROOM | Age: 79
DRG: 480 | End: 2019-01-01
Payer: MEDICARE

## 2019-01-01 ENCOUNTER — HOSPITAL ENCOUNTER (EMERGENCY)
Age: 79
Discharge: HOME | End: 2019-01-18
Attending: EMERGENCY MEDICINE
Payer: MEDICARE

## 2019-01-01 ENCOUNTER — OFFICE VISIT (OUTPATIENT)
Dept: VASCULAR SURGERY | Age: 79
End: 2019-01-01
Payer: MEDICARE

## 2019-01-01 ENCOUNTER — ANESTHESIA (OUTPATIENT)
Dept: OPERATING ROOM | Age: 79
DRG: 480 | End: 2019-01-01
Payer: MEDICARE

## 2019-01-01 ENCOUNTER — APPOINTMENT (OUTPATIENT)
Dept: CT IMAGING | Age: 79
DRG: 480 | End: 2019-01-01
Payer: MEDICARE

## 2019-01-01 VITALS
OXYGEN SATURATION: 93 % | TEMPERATURE: 97.9 F | SYSTOLIC BLOOD PRESSURE: 147 MMHG | RESPIRATION RATE: 1 BRPM | DIASTOLIC BLOOD PRESSURE: 103 MMHG

## 2019-01-01 VITALS
HEIGHT: 61 IN | HEART RATE: 104 BPM | BODY MASS INDEX: 27.58 KG/M2 | RESPIRATION RATE: 18 BRPM | WEIGHT: 146.1 LBS | TEMPERATURE: 98.1 F | DIASTOLIC BLOOD PRESSURE: 63 MMHG | OXYGEN SATURATION: 95 % | SYSTOLIC BLOOD PRESSURE: 117 MMHG

## 2019-01-01 VITALS
DIASTOLIC BLOOD PRESSURE: 63 MMHG | RESPIRATION RATE: 16 BRPM | HEART RATE: 83 BPM | WEIGHT: 125 LBS | HEIGHT: 61 IN | BODY MASS INDEX: 23.6 KG/M2 | TEMPERATURE: 98.6 F | SYSTOLIC BLOOD PRESSURE: 90 MMHG | OXYGEN SATURATION: 99 %

## 2019-01-01 DIAGNOSIS — I82.512 CHRONIC DEEP VEIN THROMBOSIS (DVT) OF FEMORAL VEIN OF LEFT LOWER EXTREMITY (HCC): Primary | ICD-10-CM

## 2019-01-01 DIAGNOSIS — S42.292A FRACTURE OF HUMERAL HEAD, CLOSED, LEFT, INITIAL ENCOUNTER: ICD-10-CM

## 2019-01-01 DIAGNOSIS — S72.145A CLOSED NONDISPLACED INTERTROCHANTERIC FRACTURE OF LEFT FEMUR, INITIAL ENCOUNTER (HCC): Primary | ICD-10-CM

## 2019-01-01 DIAGNOSIS — R52 PAIN: ICD-10-CM

## 2019-01-01 DIAGNOSIS — N30.00 ACUTE CYSTITIS WITHOUT HEMATURIA: ICD-10-CM

## 2019-01-01 DIAGNOSIS — I82.5Z3 CHRONIC DEEP VEIN THROMBOSIS (DVT) OF DISTAL VEIN OF BOTH LOWER EXTREMITIES (HCC): Primary | ICD-10-CM

## 2019-01-01 LAB
ABO/RH: NORMAL
ALBUMIN SERPL-MCNC: 1.7 G/DL (ref 3.5–5.2)
ALBUMIN SERPL-MCNC: 1.8 G/DL (ref 3.5–5.2)
ALBUMIN SERPL-MCNC: 1.8 G/DL (ref 3.5–5.2)
ALBUMIN SERPL-MCNC: 1.9 G/DL (ref 3.5–5.2)
ALBUMIN SERPL-MCNC: 2.2 G/DL (ref 3.5–5.2)
ALBUMIN SERPL-MCNC: 2.5 G/DL (ref 3.5–5.2)
ALBUMIN SERPL-MCNC: 2.7 G/DL (ref 3.5–5.2)
ALP BLD-CCNC: 179 U/L (ref 35–104)
ALP BLD-CCNC: 185 U/L (ref 35–104)
ALP BLD-CCNC: 217 U/L (ref 35–104)
ALP BLD-CCNC: 251 U/L (ref 35–104)
ALP BLD-CCNC: 270 U/L (ref 35–104)
ALP BLD-CCNC: 337 U/L (ref 35–104)
ALP BLD-CCNC: 340 U/L (ref 35–104)
ALT SERPL-CCNC: 14 U/L (ref 0–32)
ALT SERPL-CCNC: 17 U/L (ref 0–32)
ALT SERPL-CCNC: 25 U/L (ref 0–32)
ALT SERPL-CCNC: 32 U/L (ref 0–32)
ALT SERPL-CCNC: 35 U/L (ref 0–32)
ALT SERPL-CCNC: 37 U/L (ref 0–32)
ALT SERPL-CCNC: 41 U/L (ref 0–32)
ANION GAP SERPL CALCULATED.3IONS-SCNC: 10 MMOL/L (ref 7–16)
ANION GAP SERPL CALCULATED.3IONS-SCNC: 11 MMOL/L (ref 7–16)
ANION GAP SERPL CALCULATED.3IONS-SCNC: 11 MMOL/L (ref 7–16)
ANION GAP SERPL CALCULATED.3IONS-SCNC: 13 MMOL/L (ref 7–16)
ANION GAP SERPL CALCULATED.3IONS-SCNC: 13 MMOL/L (ref 7–16)
ANION GAP SERPL CALCULATED.3IONS-SCNC: 8 MMOL/L (ref 7–16)
ANION GAP SERPL CALCULATED.3IONS-SCNC: 9 MMOL/L (ref 7–16)
ANION GAP SERPL CALCULATED.3IONS-SCNC: 9 MMOL/L (ref 7–16)
ANISOCYTOSIS: ABNORMAL
ANTIBODY SCREEN: NORMAL
APTT: 22.7 SEC (ref 24.5–35.1)
APTT: 28.8 SEC (ref 24.5–35.1)
APTT: <20 SEC (ref 24.5–35.1)
AST SERPL-CCNC: 29 U/L (ref 0–31)
AST SERPL-CCNC: 33 U/L (ref 0–31)
AST SERPL-CCNC: 45 U/L (ref 0–31)
AST SERPL-CCNC: 49 U/L (ref 0–31)
AST SERPL-CCNC: 78 U/L (ref 0–31)
AST SERPL-CCNC: 79 U/L (ref 0–31)
AST SERPL-CCNC: 82 U/L (ref 0–31)
BACTERIA: ABNORMAL /HPF
BASOPHILS ABSOLUTE: 0 E9/L (ref 0–0.2)
BASOPHILS ABSOLUTE: 0.01 E9/L (ref 0–0.2)
BASOPHILS ABSOLUTE: 0.01 E9/L (ref 0–0.2)
BASOPHILS ABSOLUTE: 0.02 E9/L (ref 0–0.2)
BASOPHILS ABSOLUTE: 0.02 E9/L (ref 0–0.2)
BASOPHILS ABSOLUTE: 0.03 E9/L (ref 0–0.2)
BASOPHILS RELATIVE PERCENT: 0 % (ref 0–2)
BASOPHILS RELATIVE PERCENT: 0.2 % (ref 0–2)
BASOPHILS RELATIVE PERCENT: 0.3 % (ref 0–2)
BASOPHILS RELATIVE PERCENT: 0.4 % (ref 0–2)
BILIRUB SERPL-MCNC: 0.6 MG/DL (ref 0–1.2)
BILIRUB SERPL-MCNC: 0.6 MG/DL (ref 0–1.2)
BILIRUB SERPL-MCNC: 0.7 MG/DL (ref 0–1.2)
BILIRUB SERPL-MCNC: 0.7 MG/DL (ref 0–1.2)
BILIRUB SERPL-MCNC: 0.8 MG/DL (ref 0–1.2)
BILIRUB SERPL-MCNC: 0.8 MG/DL (ref 0–1.2)
BILIRUB SERPL-MCNC: 1.4 MG/DL (ref 0–1.2)
BILIRUBIN URINE: NEGATIVE
BLOOD BANK DISPENSE STATUS: NORMAL
BLOOD BANK DISPENSE STATUS: NORMAL
BLOOD BANK PRODUCT CODE: NORMAL
BLOOD BANK PRODUCT CODE: NORMAL
BLOOD CULTURE, ROUTINE: NORMAL
BLOOD, URINE: NEGATIVE
BPU ID: NORMAL
BPU ID: NORMAL
BUN BLDV-MCNC: 13 MG/DL (ref 8–23)
BUN BLDV-MCNC: 13 MG/DL (ref 8–23)
BUN BLDV-MCNC: 14 MG/DL (ref 8–23)
BUN BLDV-MCNC: 14 MG/DL (ref 8–23)
BUN BLDV-MCNC: 15 MG/DL (ref 8–23)
BUN BLDV-MCNC: 16 MG/DL (ref 8–23)
BUN BLDV-MCNC: 16 MG/DL (ref 8–23)
BUN BLDV-MCNC: 17 MG/DL (ref 8–23)
CALCIUM SERPL-MCNC: 7 MG/DL (ref 8.6–10.2)
CALCIUM SERPL-MCNC: 7.1 MG/DL (ref 8.6–10.2)
CALCIUM SERPL-MCNC: 7.3 MG/DL (ref 8.6–10.2)
CALCIUM SERPL-MCNC: 7.4 MG/DL (ref 8.6–10.2)
CALCIUM SERPL-MCNC: 7.5 MG/DL (ref 8.6–10.2)
CALCIUM SERPL-MCNC: 7.5 MG/DL (ref 8.6–10.2)
CALCIUM SERPL-MCNC: 7.6 MG/DL (ref 8.6–10.2)
CALCIUM SERPL-MCNC: 7.6 MG/DL (ref 8.6–10.2)
CASTS: ABNORMAL /LPF
CHLORIDE BLD-SCNC: 103 MMOL/L (ref 98–107)
CHLORIDE BLD-SCNC: 104 MMOL/L (ref 98–107)
CHLORIDE BLD-SCNC: 105 MMOL/L (ref 98–107)
CHLORIDE BLD-SCNC: 106 MMOL/L (ref 98–107)
CHLORIDE BLD-SCNC: 106 MMOL/L (ref 98–107)
CHLORIDE BLD-SCNC: 107 MMOL/L (ref 98–107)
CLARITY: CLEAR
CO2: 23 MMOL/L (ref 22–29)
CO2: 25 MMOL/L (ref 22–29)
CO2: 25 MMOL/L (ref 22–29)
COLOR: YELLOW
CREAT SERPL-MCNC: 0.5 MG/DL (ref 0.5–1)
CREAT SERPL-MCNC: 0.6 MG/DL (ref 0.5–1)
CREAT SERPL-MCNC: 0.7 MG/DL (ref 0.5–1)
CREAT SERPL-MCNC: 0.7 MG/DL (ref 0.5–1)
CREAT SERPL-MCNC: 0.8 MG/DL (ref 0.5–1)
CULTURE, BLOOD 2: NORMAL
DESCRIPTION BLOOD BANK: NORMAL
DESCRIPTION BLOOD BANK: NORMAL
EKG ATRIAL RATE: 107 BPM
EKG ATRIAL RATE: 78 BPM
EKG ATRIAL RATE: 82 BPM
EKG Q-T INTERVAL: 284 MS
EKG Q-T INTERVAL: 288 MS
EKG Q-T INTERVAL: 418 MS
EKG QRS DURATION: 58 MS
EKG QRS DURATION: 60 MS
EKG QRS DURATION: 60 MS
EKG QTC CALCULATION (BAZETT): 399 MS
EKG QTC CALCULATION (BAZETT): 401 MS
EKG QTC CALCULATION (BAZETT): 511 MS
EKG R AXIS: -7 DEGREES
EKG R AXIS: 18 DEGREES
EKG R AXIS: 2 DEGREES
EKG T AXIS: -164 DEGREES
EKG T AXIS: 163 DEGREES
EKG T AXIS: 72 DEGREES
EKG VENTRICULAR RATE: 117 BPM
EKG VENTRICULAR RATE: 119 BPM
EKG VENTRICULAR RATE: 90 BPM
EOSINOPHILS ABSOLUTE: 0 E9/L (ref 0.05–0.5)
EOSINOPHILS ABSOLUTE: 0.01 E9/L (ref 0.05–0.5)
EOSINOPHILS ABSOLUTE: 0.02 E9/L (ref 0.05–0.5)
EOSINOPHILS ABSOLUTE: 0.08 E9/L (ref 0.05–0.5)
EOSINOPHILS RELATIVE PERCENT: 0 % (ref 0–6)
EOSINOPHILS RELATIVE PERCENT: 0.1 % (ref 0–6)
EOSINOPHILS RELATIVE PERCENT: 0.2 % (ref 0–6)
EOSINOPHILS RELATIVE PERCENT: 0.2 % (ref 0–6)
EOSINOPHILS RELATIVE PERCENT: 0.4 % (ref 0–6)
EOSINOPHILS RELATIVE PERCENT: 1.2 % (ref 0–6)
FERRITIN: 269 NG/ML
GFR AFRICAN AMERICAN: >60
GFR NON-AFRICAN AMERICAN: >60 ML/MIN/1.73
GLUCOSE BLD-MCNC: 110 MG/DL (ref 74–99)
GLUCOSE BLD-MCNC: 113 MG/DL (ref 74–99)
GLUCOSE BLD-MCNC: 69 MG/DL (ref 74–99)
GLUCOSE BLD-MCNC: 78 MG/DL (ref 74–99)
GLUCOSE BLD-MCNC: 82 MG/DL (ref 74–99)
GLUCOSE BLD-MCNC: 86 MG/DL (ref 74–99)
GLUCOSE BLD-MCNC: 89 MG/DL (ref 74–99)
GLUCOSE BLD-MCNC: 89 MG/DL (ref 74–99)
GLUCOSE URINE: NEGATIVE MG/DL
HCT VFR BLD CALC: 26.3 % (ref 34–48)
HCT VFR BLD CALC: 28.5 % (ref 34–48)
HCT VFR BLD CALC: 29.9 % (ref 34–48)
HCT VFR BLD CALC: 31.2 % (ref 34–48)
HCT VFR BLD CALC: 33 % (ref 34–48)
HCT VFR BLD CALC: 33.3 % (ref 34–48)
HCT VFR BLD CALC: 40.9 % (ref 34–48)
HEMOGLOBIN: 10.6 G/DL (ref 11.5–15.5)
HEMOGLOBIN: 10.6 G/DL (ref 11.5–15.5)
HEMOGLOBIN: 13.3 G/DL (ref 11.5–15.5)
HEMOGLOBIN: 8.6 G/DL (ref 11.5–15.5)
HEMOGLOBIN: 9 G/DL (ref 11.5–15.5)
HEMOGLOBIN: 9.1 G/DL (ref 11.5–15.5)
HEMOGLOBIN: 9.7 G/DL (ref 11.5–15.5)
IMMATURE GRANULOCYTES #: 0.01 E9/L
IMMATURE GRANULOCYTES #: 0.02 E9/L
IMMATURE GRANULOCYTES #: 0.03 E9/L
IMMATURE GRANULOCYTES #: 0.04 E9/L
IMMATURE GRANULOCYTES #: 0.04 E9/L
IMMATURE GRANULOCYTES #: 0.1 E9/L
IMMATURE GRANULOCYTES %: 0.2 % (ref 0–5)
IMMATURE GRANULOCYTES %: 0.3 % (ref 0–5)
IMMATURE GRANULOCYTES %: 0.5 % (ref 0–5)
IMMATURE GRANULOCYTES %: 0.7 % (ref 0–5)
IMMATURE GRANULOCYTES %: 0.7 % (ref 0–5)
IMMATURE GRANULOCYTES %: 1.2 % (ref 0–5)
INR BLD: 1.1
INR BLD: 1.3
INR BLD: 1.3
INR BLD: 1.7
INR BLD: ABNORMAL
IRON SATURATION: 50 % (ref 15–50)
IRON: 55 MCG/DL (ref 37–145)
KETONES, URINE: NEGATIVE MG/DL
LEUKOCYTE ESTERASE, URINE: ABNORMAL
LYMPHOCYTES ABSOLUTE: 1.24 E9/L (ref 1.5–4)
LYMPHOCYTES ABSOLUTE: 1.33 E9/L (ref 1.5–4)
LYMPHOCYTES ABSOLUTE: 1.69 E9/L (ref 1.5–4)
LYMPHOCYTES ABSOLUTE: 1.93 E9/L (ref 1.5–4)
LYMPHOCYTES ABSOLUTE: 1.99 E9/L (ref 1.5–4)
LYMPHOCYTES ABSOLUTE: 2.82 E9/L (ref 1.5–4)
LYMPHOCYTES RELATIVE PERCENT: 16.5 % (ref 20–42)
LYMPHOCYTES RELATIVE PERCENT: 20 % (ref 20–42)
LYMPHOCYTES RELATIVE PERCENT: 28.9 % (ref 20–42)
LYMPHOCYTES RELATIVE PERCENT: 31.6 % (ref 20–42)
LYMPHOCYTES RELATIVE PERCENT: 35.2 % (ref 20–42)
LYMPHOCYTES RELATIVE PERCENT: 41.5 % (ref 20–42)
MCH RBC QN AUTO: 23.3 PG (ref 26–35)
MCH RBC QN AUTO: 24.4 PG (ref 26–35)
MCH RBC QN AUTO: 24.6 PG (ref 26–35)
MCH RBC QN AUTO: 25.2 PG (ref 26–35)
MCH RBC QN AUTO: 25.4 PG (ref 26–35)
MCH RBC QN AUTO: 25.7 PG (ref 26–35)
MCH RBC QN AUTO: 25.7 PG (ref 26–35)
MCHC RBC AUTO-ENTMCNC: 29.2 % (ref 32–34.5)
MCHC RBC AUTO-ENTMCNC: 31.6 % (ref 32–34.5)
MCHC RBC AUTO-ENTMCNC: 31.8 % (ref 32–34.5)
MCHC RBC AUTO-ENTMCNC: 32.1 % (ref 32–34.5)
MCHC RBC AUTO-ENTMCNC: 32.4 % (ref 32–34.5)
MCHC RBC AUTO-ENTMCNC: 32.5 % (ref 32–34.5)
MCHC RBC AUTO-ENTMCNC: 32.7 % (ref 32–34.5)
MCV RBC AUTO: 73.3 FL (ref 80–99.9)
MCV RBC AUTO: 75.1 FL (ref 80–99.9)
MCV RBC AUTO: 75.4 FL (ref 80–99.9)
MCV RBC AUTO: 79 FL (ref 80–99.9)
MCV RBC AUTO: 79.8 FL (ref 80–99.9)
MCV RBC AUTO: 80.1 FL (ref 80–99.9)
MCV RBC AUTO: 87.2 FL (ref 80–99.9)
MONOCYTES ABSOLUTE: 0.38 E9/L (ref 0.1–0.95)
MONOCYTES ABSOLUTE: 0.41 E9/L (ref 0.1–0.95)
MONOCYTES ABSOLUTE: 0.42 E9/L (ref 0.1–0.95)
MONOCYTES ABSOLUTE: 0.43 E9/L (ref 0.1–0.95)
MONOCYTES ABSOLUTE: 0.44 E9/L (ref 0.1–0.95)
MONOCYTES ABSOLUTE: 0.63 E9/L (ref 0.1–0.95)
MONOCYTES RELATIVE PERCENT: 5.1 % (ref 2–12)
MONOCYTES RELATIVE PERCENT: 6.1 % (ref 2–12)
MONOCYTES RELATIVE PERCENT: 6.9 % (ref 2–12)
MONOCYTES RELATIVE PERCENT: 7.4 % (ref 2–12)
MONOCYTES RELATIVE PERCENT: 7.8 % (ref 2–12)
MONOCYTES RELATIVE PERCENT: 9.3 % (ref 2–12)
NEUTROPHILS ABSOLUTE: 3.17 E9/L (ref 1.8–7.3)
NEUTROPHILS ABSOLUTE: 3.22 E9/L (ref 1.8–7.3)
NEUTROPHILS ABSOLUTE: 3.69 E9/L (ref 1.8–7.3)
NEUTROPHILS ABSOLUTE: 3.69 E9/L (ref 1.8–7.3)
NEUTROPHILS ABSOLUTE: 4.53 E9/L (ref 1.8–7.3)
NEUTROPHILS ABSOLUTE: 6.17 E9/L (ref 1.8–7.3)
NEUTROPHILS RELATIVE PERCENT: 47.3 % (ref 43–80)
NEUTROPHILS RELATIVE PERCENT: 55.9 % (ref 43–80)
NEUTROPHILS RELATIVE PERCENT: 60.5 % (ref 43–80)
NEUTROPHILS RELATIVE PERCENT: 63.1 % (ref 43–80)
NEUTROPHILS RELATIVE PERCENT: 73 % (ref 43–80)
NEUTROPHILS RELATIVE PERCENT: 76.9 % (ref 43–80)
NITRITE, URINE: NEGATIVE
ORGANISM: ABNORMAL
PDW BLD-RTO: 18.5 FL (ref 11.5–15)
PDW BLD-RTO: 18.6 FL (ref 11.5–15)
PDW BLD-RTO: 18.6 FL (ref 11.5–15)
PDW BLD-RTO: 19 FL (ref 11.5–15)
PDW BLD-RTO: 19.1 FL (ref 11.5–15)
PDW BLD-RTO: 19.9 FL (ref 11.5–15)
PDW BLD-RTO: 20.5 FL (ref 11.5–15)
PH UA: 5.5 (ref 5–9)
PLATELET # BLD: 141 E9/L (ref 130–450)
PLATELET # BLD: 145 E9/L (ref 130–450)
PLATELET # BLD: 152 E9/L (ref 130–450)
PLATELET # BLD: 161 E9/L (ref 130–450)
PLATELET # BLD: 179 E9/L (ref 130–450)
PLATELET # BLD: 202 E9/L (ref 130–450)
PLATELET # BLD: 213 E9/L (ref 130–450)
PMV BLD AUTO: 11.8 FL (ref 7–12)
PMV BLD AUTO: 12 FL (ref 7–12)
PMV BLD AUTO: 12.1 FL (ref 7–12)
PMV BLD AUTO: 12.2 FL (ref 7–12)
PMV BLD AUTO: 12.2 FL (ref 7–12)
PMV BLD AUTO: 12.3 FL (ref 7–12)
PMV BLD AUTO: 12.3 FL (ref 7–12)
POLYCHROMASIA: ABNORMAL
POTASSIUM REFLEX MAGNESIUM: 3.7 MMOL/L (ref 3.5–5)
POTASSIUM REFLEX MAGNESIUM: 3.8 MMOL/L (ref 3.5–5)
POTASSIUM REFLEX MAGNESIUM: 4.3 MMOL/L (ref 3.5–5)
POTASSIUM REFLEX MAGNESIUM: 4.4 MMOL/L (ref 3.5–5)
POTASSIUM REFLEX MAGNESIUM: 4.4 MMOL/L (ref 3.5–5)
POTASSIUM REFLEX MAGNESIUM: 6.3 MMOL/L (ref 3.5–5)
POTASSIUM SERPL-SCNC: 3.8 MMOL/L (ref 3.5–5)
POTASSIUM SERPL-SCNC: 3.9 MMOL/L (ref 3.5–5)
POTASSIUM SERPL-SCNC: 4.2 MMOL/L (ref 3.5–5)
PROTEIN UA: NEGATIVE MG/DL
PROTHROMBIN TIME: 12.3 SEC (ref 9.3–12.4)
PROTHROMBIN TIME: 14.9 SEC (ref 9.3–12.4)
PROTHROMBIN TIME: 15.1 SEC (ref 9.3–12.4)
PROTHROMBIN TIME: 18.7 SEC (ref 9.3–12.4)
PROTHROMBIN TIME: ABNORMAL SEC (ref 9.3–12.4)
RBC # BLD: 3.49 E12/L (ref 3.5–5.5)
RBC # BLD: 3.57 E12/L (ref 3.5–5.5)
RBC # BLD: 3.58 E12/L (ref 3.5–5.5)
RBC # BLD: 3.98 E12/L (ref 3.5–5.5)
RBC # BLD: 4.12 E12/L (ref 3.5–5.5)
RBC # BLD: 4.54 E12/L (ref 3.5–5.5)
RBC # BLD: 5.18 E12/L (ref 3.5–5.5)
RBC UA: ABNORMAL /HPF (ref 0–2)
REASON FOR REJECTION: NORMAL
REASON FOR REJECTION: NORMAL
REJECTED TEST: NORMAL
REJECTED TEST: NORMAL
SODIUM BLD-SCNC: 137 MMOL/L (ref 132–146)
SODIUM BLD-SCNC: 138 MMOL/L (ref 132–146)
SODIUM BLD-SCNC: 139 MMOL/L (ref 132–146)
SODIUM BLD-SCNC: 140 MMOL/L (ref 132–146)
SODIUM BLD-SCNC: 140 MMOL/L (ref 132–146)
SODIUM BLD-SCNC: 143 MMOL/L (ref 132–146)
SPECIFIC GRAVITY UA: 1.01 (ref 1–1.03)
TOTAL IRON BINDING CAPACITY: 109 MCG/DL (ref 250–450)
TOTAL PROTEIN: 3.8 G/DL (ref 6.4–8.3)
TOTAL PROTEIN: 3.8 G/DL (ref 6.4–8.3)
TOTAL PROTEIN: 4 G/DL (ref 6.4–8.3)
TOTAL PROTEIN: 4 G/DL (ref 6.4–8.3)
TOTAL PROTEIN: 4.4 G/DL (ref 6.4–8.3)
TOTAL PROTEIN: 5.3 G/DL (ref 6.4–8.3)
TOTAL PROTEIN: 5.5 G/DL (ref 6.4–8.3)
TROPONIN: <0.01 NG/ML (ref 0–0.03)
TROPONIN: <0.01 NG/ML (ref 0–0.03)
TSH SERPL DL<=0.05 MIU/L-ACNC: 2.17 UIU/ML (ref 0.27–4.2)
URINE CULTURE, ROUTINE: ABNORMAL
URINE CULTURE, ROUTINE: ABNORMAL
UROBILINOGEN, URINE: 0.2 E.U./DL
WBC # BLD: 5.7 E9/L (ref 4.5–11.5)
WBC # BLD: 5.7 E9/L (ref 4.5–11.5)
WBC # BLD: 5.8 E9/L (ref 4.5–11.5)
WBC # BLD: 6.1 E9/L (ref 4.5–11.5)
WBC # BLD: 6.2 E9/L (ref 4.5–11.5)
WBC # BLD: 6.8 E9/L (ref 4.5–11.5)
WBC # BLD: 8 E9/L (ref 4.5–11.5)
WBC UA: >20 /HPF (ref 0–5)

## 2019-01-01 PROCEDURE — 94760 N-INVAS EAR/PLS OXIMETRY 1: CPT

## 2019-01-01 PROCEDURE — 80048 BASIC METABOLIC PNL TOTAL CA: CPT

## 2019-01-01 PROCEDURE — 51702 INSERT TEMP BLADDER CATH: CPT

## 2019-01-01 PROCEDURE — 2580000003 HC RX 258: Performed by: INTERNAL MEDICINE

## 2019-01-01 PROCEDURE — 70450 CT HEAD/BRAIN W/O DYE: CPT

## 2019-01-01 PROCEDURE — 6360000002 HC RX W HCPCS: Performed by: EMERGENCY MEDICINE

## 2019-01-01 PROCEDURE — 6370000000 HC RX 637 (ALT 250 FOR IP): Performed by: INTERNAL MEDICINE

## 2019-01-01 PROCEDURE — 3700000000 HC ANESTHESIA ATTENDED CARE: Performed by: ORTHOPAEDIC SURGERY

## 2019-01-01 PROCEDURE — 6360000002 HC RX W HCPCS: Performed by: NURSE ANESTHETIST, CERTIFIED REGISTERED

## 2019-01-01 PROCEDURE — 80053 COMPREHEN METABOLIC PANEL: CPT

## 2019-01-01 PROCEDURE — 85025 COMPLETE CBC W/AUTO DIFF WBC: CPT

## 2019-01-01 PROCEDURE — 36415 COLL VENOUS BLD VENIPUNCTURE: CPT

## 2019-01-01 PROCEDURE — 2580000003 HC RX 258: Performed by: ORTHOPAEDIC SURGERY

## 2019-01-01 PROCEDURE — 1200000000 HC SEMI PRIVATE

## 2019-01-01 PROCEDURE — 85730 THROMBOPLASTIN TIME PARTIAL: CPT

## 2019-01-01 PROCEDURE — 6370000000 HC RX 637 (ALT 250 FOR IP): Performed by: ORTHOPAEDIC SURGERY

## 2019-01-01 PROCEDURE — 97530 THERAPEUTIC ACTIVITIES: CPT

## 2019-01-01 PROCEDURE — 83550 IRON BINDING TEST: CPT

## 2019-01-01 PROCEDURE — 2580000003 HC RX 258: Performed by: STUDENT IN AN ORGANIZED HEALTH CARE EDUCATION/TRAINING PROGRAM

## 2019-01-01 PROCEDURE — 2580000003 HC RX 258: Performed by: RADIOLOGY

## 2019-01-01 PROCEDURE — 6360000002 HC RX W HCPCS: Performed by: ORTHOPAEDIC SURGERY

## 2019-01-01 PROCEDURE — 2500000003 HC RX 250 WO HCPCS: Performed by: INTERNAL MEDICINE

## 2019-01-01 PROCEDURE — 2060000000 HC ICU INTERMEDIATE R&B

## 2019-01-01 PROCEDURE — 85610 PROTHROMBIN TIME: CPT

## 2019-01-01 PROCEDURE — 2580000003 HC RX 258: Performed by: NURSE ANESTHETIST, CERTIFIED REGISTERED

## 2019-01-01 PROCEDURE — 93005 ELECTROCARDIOGRAM TRACING: CPT | Performed by: EMERGENCY MEDICINE

## 2019-01-01 PROCEDURE — 86850 RBC ANTIBODY SCREEN: CPT

## 2019-01-01 PROCEDURE — 6360000002 HC RX W HCPCS: Performed by: INTERNAL MEDICINE

## 2019-01-01 PROCEDURE — 93005 ELECTROCARDIOGRAM TRACING: CPT | Performed by: INTERNAL MEDICINE

## 2019-01-01 PROCEDURE — 86901 BLOOD TYPING SEROLOGIC RH(D): CPT

## 2019-01-01 PROCEDURE — 2700000000 HC OXYGEN THERAPY PER DAY

## 2019-01-01 PROCEDURE — 99213 OFFICE O/P EST LOW 20 MIN: CPT | Performed by: SURGERY

## 2019-01-01 PROCEDURE — 0QS706Z REPOSITION LEFT UPPER FEMUR WITH INTRAMEDULLARY INTERNAL FIXATION DEVICE, OPEN APPROACH: ICD-10-PCS | Performed by: ORTHOPAEDIC SURGERY

## 2019-01-01 PROCEDURE — 7100000001 HC PACU RECOVERY - ADDTL 15 MIN: Performed by: ORTHOPAEDIC SURGERY

## 2019-01-01 PROCEDURE — C1776 JOINT DEVICE (IMPLANTABLE): HCPCS | Performed by: ORTHOPAEDIC SURGERY

## 2019-01-01 PROCEDURE — 86900 BLOOD TYPING SEROLOGIC ABO: CPT

## 2019-01-01 PROCEDURE — 96375 TX/PRO/DX INJ NEW DRUG ADDON: CPT

## 2019-01-01 PROCEDURE — 7100000000 HC PACU RECOVERY - FIRST 15 MIN: Performed by: ORTHOPAEDIC SURGERY

## 2019-01-01 PROCEDURE — 84443 ASSAY THYROID STIM HORMONE: CPT

## 2019-01-01 PROCEDURE — 96365 THER/PROPH/DIAG IV INF INIT: CPT

## 2019-01-01 PROCEDURE — 87040 BLOOD CULTURE FOR BACTERIA: CPT

## 2019-01-01 PROCEDURE — 72125 CT NECK SPINE W/O DYE: CPT

## 2019-01-01 PROCEDURE — C1769 GUIDE WIRE: HCPCS | Performed by: ORTHOPAEDIC SURGERY

## 2019-01-01 PROCEDURE — C1713 ANCHOR/SCREW BN/BN,TIS/BN: HCPCS | Performed by: ORTHOPAEDIC SURGERY

## 2019-01-01 PROCEDURE — 97535 SELF CARE MNGMENT TRAINING: CPT

## 2019-01-01 PROCEDURE — 2W3BXYZ IMMOBILIZATION OF LEFT UPPER ARM USING OTHER DEVICE: ICD-10-PCS | Performed by: EMERGENCY MEDICINE

## 2019-01-01 PROCEDURE — 73030 X-RAY EXAM OF SHOULDER: CPT

## 2019-01-01 PROCEDURE — 87088 URINE BACTERIA CULTURE: CPT

## 2019-01-01 PROCEDURE — 84484 ASSAY OF TROPONIN QUANT: CPT

## 2019-01-01 PROCEDURE — 71275 CT ANGIOGRAPHY CHEST: CPT

## 2019-01-01 PROCEDURE — P9016 RBC LEUKOCYTES REDUCED: HCPCS

## 2019-01-01 PROCEDURE — 3600000013 HC SURGERY LEVEL 3 ADDTL 15MIN: Performed by: ORTHOPAEDIC SURGERY

## 2019-01-01 PROCEDURE — 2709999900 HC NON-CHARGEABLE SUPPLY: Performed by: ORTHOPAEDIC SURGERY

## 2019-01-01 PROCEDURE — 36430 TRANSFUSION BLD/BLD COMPNT: CPT

## 2019-01-01 PROCEDURE — 6360000002 HC RX W HCPCS

## 2019-01-01 PROCEDURE — 71045 X-RAY EXAM CHEST 1 VIEW: CPT

## 2019-01-01 PROCEDURE — 70486 CT MAXILLOFACIAL W/O DYE: CPT

## 2019-01-01 PROCEDURE — 83540 ASSAY OF IRON: CPT

## 2019-01-01 PROCEDURE — 97161 PT EVAL LOW COMPLEX 20 MIN: CPT

## 2019-01-01 PROCEDURE — 2500000003 HC RX 250 WO HCPCS: Performed by: ORTHOPAEDIC SURGERY

## 2019-01-01 PROCEDURE — 85027 COMPLETE CBC AUTOMATED: CPT

## 2019-01-01 PROCEDURE — 87186 SC STD MICRODIL/AGAR DIL: CPT

## 2019-01-01 PROCEDURE — 97165 OT EVAL LOW COMPLEX 30 MIN: CPT

## 2019-01-01 PROCEDURE — 82728 ASSAY OF FERRITIN: CPT

## 2019-01-01 PROCEDURE — 81001 URINALYSIS AUTO W/SCOPE: CPT

## 2019-01-01 PROCEDURE — P9040 RBC LEUKOREDUCED IRRADIATED: HCPCS

## 2019-01-01 PROCEDURE — 3209999900 FLUORO FOR SURGICAL PROCEDURES

## 2019-01-01 PROCEDURE — 2720000010 HC SURG SUPPLY STERILE: Performed by: ORTHOPAEDIC SURGERY

## 2019-01-01 PROCEDURE — 2580000003 HC RX 258: Performed by: EMERGENCY MEDICINE

## 2019-01-01 PROCEDURE — 87077 CULTURE AEROBIC IDENTIFY: CPT

## 2019-01-01 PROCEDURE — 86923 COMPATIBILITY TEST ELECTRIC: CPT

## 2019-01-01 PROCEDURE — 73502 X-RAY EXAM HIP UNI 2-3 VIEWS: CPT

## 2019-01-01 PROCEDURE — 99285 EMERGENCY DEPT VISIT HI MDM: CPT

## 2019-01-01 PROCEDURE — 3700000001 HC ADD 15 MINUTES (ANESTHESIA): Performed by: ORTHOPAEDIC SURGERY

## 2019-01-01 PROCEDURE — 6360000004 HC RX CONTRAST MEDICATION: Performed by: RADIOLOGY

## 2019-01-01 PROCEDURE — 99284 EMERGENCY DEPT VISIT MOD MDM: CPT

## 2019-01-01 PROCEDURE — 2500000003 HC RX 250 WO HCPCS: Performed by: NURSE ANESTHETIST, CERTIFIED REGISTERED

## 2019-01-01 PROCEDURE — 96376 TX/PRO/DX INJ SAME DRUG ADON: CPT

## 2019-01-01 PROCEDURE — 84132 ASSAY OF SERUM POTASSIUM: CPT

## 2019-01-01 PROCEDURE — 3600000003 HC SURGERY LEVEL 3 BASE: Performed by: ORTHOPAEDIC SURGERY

## 2019-01-01 DEVICE — SCREW TFNA FEN 100MM: Type: IMPLANTABLE DEVICE | Site: HIP | Status: FUNCTIONAL

## 2019-01-01 DEVICE — SCREW BNE L42MM DIA5MM TIB LT GRN TI ST CANN LOK FULL THRD: Type: IMPLANTABLE DEVICE | Site: HIP | Status: FUNCTIONAL

## 2019-01-01 DEVICE — NAIL IM L170MM DIA11MM NK 125DEG SHT PROX FEM GRN TI-15MO: Type: IMPLANTABLE DEVICE | Site: HIP | Status: FUNCTIONAL

## 2019-01-01 RX ORDER — CHOLESTYRAMINE 4 G/9G
1 POWDER, FOR SUSPENSION ORAL DAILY
Status: DISCONTINUED | OUTPATIENT
Start: 2019-01-01 | End: 2019-01-01 | Stop reason: HOSPADM

## 2019-01-01 RX ORDER — SODIUM CHLORIDE 0.9 % (FLUSH) 0.9 %
10 SYRINGE (ML) INJECTION PRN
Status: DISCONTINUED | OUTPATIENT
Start: 2019-01-01 | End: 2019-01-01 | Stop reason: HOSPADM

## 2019-01-01 RX ORDER — ONDANSETRON 2 MG/ML
4 INJECTION INTRAMUSCULAR; INTRAVENOUS EVERY 6 HOURS PRN
Status: DISCONTINUED | OUTPATIENT
Start: 2019-01-01 | End: 2019-01-01

## 2019-01-01 RX ORDER — FENTANYL CITRATE 50 UG/ML
INJECTION, SOLUTION INTRAMUSCULAR; INTRAVENOUS PRN
Status: DISCONTINUED | OUTPATIENT
Start: 2019-01-01 | End: 2019-01-01 | Stop reason: SDUPTHER

## 2019-01-01 RX ORDER — FENTANYL CITRATE 50 UG/ML
50 INJECTION, SOLUTION INTRAMUSCULAR; INTRAVENOUS ONCE
Status: COMPLETED | OUTPATIENT
Start: 2019-01-01 | End: 2019-01-01

## 2019-01-01 RX ORDER — PROPOFOL 10 MG/ML
INJECTION, EMULSION INTRAVENOUS PRN
Status: DISCONTINUED | OUTPATIENT
Start: 2019-01-01 | End: 2019-01-01 | Stop reason: SDUPTHER

## 2019-01-01 RX ORDER — AMIODARONE HYDROCHLORIDE 200 MG/1
200 TABLET ORAL 2 TIMES DAILY
Status: DISCONTINUED | OUTPATIENT
Start: 2019-01-01 | End: 2019-01-01

## 2019-01-01 RX ORDER — DEXAMETHASONE SODIUM PHOSPHATE 4 MG/ML
INJECTION, SOLUTION INTRA-ARTICULAR; INTRALESIONAL; INTRAMUSCULAR; INTRAVENOUS; SOFT TISSUE PRN
Status: DISCONTINUED | OUTPATIENT
Start: 2019-01-01 | End: 2019-01-01 | Stop reason: SDUPTHER

## 2019-01-01 RX ORDER — TORSEMIDE 20 MG/1
20 TABLET ORAL 2 TIMES DAILY
Qty: 30 TABLET | Refills: 0 | DISCHARGE
Start: 2019-01-01

## 2019-01-01 RX ORDER — 0.9 % SODIUM CHLORIDE 0.9 %
500 INTRAVENOUS SOLUTION INTRAVENOUS ONCE
Status: COMPLETED | OUTPATIENT
Start: 2019-01-01 | End: 2019-01-01

## 2019-01-01 RX ORDER — GLYCOPYRROLATE 1 MG/5 ML
SYRINGE (ML) INTRAVENOUS PRN
Status: DISCONTINUED | OUTPATIENT
Start: 2019-01-01 | End: 2019-01-01 | Stop reason: SDUPTHER

## 2019-01-01 RX ORDER — MORPHINE SULFATE 2 MG/ML
2 INJECTION, SOLUTION INTRAMUSCULAR; INTRAVENOUS EVERY 4 HOURS PRN
Status: DISCONTINUED | OUTPATIENT
Start: 2019-01-01 | End: 2019-01-01 | Stop reason: HOSPADM

## 2019-01-01 RX ORDER — PANTOPRAZOLE SODIUM 40 MG/1
40 TABLET, DELAYED RELEASE ORAL DAILY
Status: DISCONTINUED | OUTPATIENT
Start: 2019-01-01 | End: 2019-01-01 | Stop reason: HOSPADM

## 2019-01-01 RX ORDER — LIDOCAINE HYDROCHLORIDE 20 MG/ML
INJECTION, SOLUTION EPIDURAL; INFILTRATION; INTRACAUDAL; PERINEURAL PRN
Status: DISCONTINUED | OUTPATIENT
Start: 2019-01-01 | End: 2019-01-01 | Stop reason: SDUPTHER

## 2019-01-01 RX ORDER — METOCLOPRAMIDE HYDROCHLORIDE 5 MG/ML
10 INJECTION INTRAMUSCULAR; INTRAVENOUS EVERY 6 HOURS PRN
Status: DISCONTINUED | OUTPATIENT
Start: 2019-01-01 | End: 2019-01-01 | Stop reason: HOSPADM

## 2019-01-01 RX ORDER — HYDROCODONE BITARTRATE AND ACETAMINOPHEN 5; 325 MG/1; MG/1
1 TABLET ORAL EVERY 4 HOURS PRN
Qty: 42 TABLET | Refills: 0 | Status: SHIPPED | OUTPATIENT
Start: 2019-01-01 | End: 2019-01-01

## 2019-01-01 RX ORDER — DIPHENOXYLATE HYDROCHLORIDE AND ATROPINE SULFATE 2.5; .025 MG/1; MG/1
TABLET ORAL
Refills: 0 | COMMUNITY
Start: 2019-01-01

## 2019-01-01 RX ORDER — SODIUM CHLORIDE 9 MG/ML
INJECTION, SOLUTION INTRAVENOUS CONTINUOUS
Status: DISCONTINUED | OUTPATIENT
Start: 2019-01-01 | End: 2019-01-01

## 2019-01-01 RX ORDER — DIGOXIN 0.25 MG/ML
125 INJECTION INTRAMUSCULAR; INTRAVENOUS ONCE
Status: COMPLETED | OUTPATIENT
Start: 2019-01-01 | End: 2019-01-01

## 2019-01-01 RX ORDER — AMIODARONE HYDROCHLORIDE 200 MG/1
200 TABLET ORAL DAILY
Status: DISCONTINUED | OUTPATIENT
Start: 2019-01-01 | End: 2019-01-01 | Stop reason: HOSPADM

## 2019-01-01 RX ORDER — NEOSTIGMINE METHYLSULFATE 1 MG/ML
INJECTION, SOLUTION INTRAVENOUS PRN
Status: DISCONTINUED | OUTPATIENT
Start: 2019-01-01 | End: 2019-01-01 | Stop reason: SDUPTHER

## 2019-01-01 RX ORDER — AMIODARONE HYDROCHLORIDE 200 MG/1
200 TABLET ORAL DAILY
Qty: 30 TABLET | Refills: 0 | DISCHARGE
Start: 2019-01-01

## 2019-01-01 RX ORDER — DIGOXIN 0.25 MG/ML
125 INJECTION INTRAMUSCULAR; INTRAVENOUS DAILY
Status: DISCONTINUED | OUTPATIENT
Start: 2019-01-01 | End: 2019-01-01

## 2019-01-01 RX ORDER — FENTANYL CITRATE 50 UG/ML
INJECTION, SOLUTION INTRAMUSCULAR; INTRAVENOUS
Status: COMPLETED
Start: 2019-01-01 | End: 2019-01-01

## 2019-01-01 RX ORDER — LOPERAMIDE HYDROCHLORIDE 2 MG/1
2 CAPSULE ORAL 4 TIMES DAILY PRN
Status: DISCONTINUED | OUTPATIENT
Start: 2019-01-01 | End: 2019-01-01 | Stop reason: HOSPADM

## 2019-01-01 RX ORDER — FUROSEMIDE 40 MG/1
40 TABLET ORAL 2 TIMES DAILY
Status: DISCONTINUED | OUTPATIENT
Start: 2019-01-01 | End: 2019-01-01

## 2019-01-01 RX ORDER — APIXABAN 5 MG/1
5 TABLET, FILM COATED ORAL 2 TIMES DAILY
Refills: 4 | COMMUNITY
Start: 2019-01-01

## 2019-01-01 RX ORDER — SODIUM CHLORIDE 9 MG/ML
INJECTION, SOLUTION INTRAVENOUS CONTINUOUS PRN
Status: DISCONTINUED | OUTPATIENT
Start: 2019-01-01 | End: 2019-01-01

## 2019-01-01 RX ORDER — BUPIVACAINE HYDROCHLORIDE 5 MG/ML
INJECTION, SOLUTION EPIDURAL; INTRACAUDAL PRN
Status: DISCONTINUED | OUTPATIENT
Start: 2019-01-01 | End: 2019-01-01 | Stop reason: ALTCHOICE

## 2019-01-01 RX ORDER — FENTANYL CITRATE 50 UG/ML
25 INJECTION, SOLUTION INTRAMUSCULAR; INTRAVENOUS EVERY 5 MIN PRN
Status: DISCONTINUED | OUTPATIENT
Start: 2019-01-01 | End: 2019-01-01 | Stop reason: HOSPADM

## 2019-01-01 RX ORDER — SODIUM CHLORIDE 0.9 % (FLUSH) 0.9 %
10 SYRINGE (ML) INJECTION EVERY 12 HOURS SCHEDULED
Status: DISCONTINUED | OUTPATIENT
Start: 2019-01-01 | End: 2019-01-01 | Stop reason: HOSPADM

## 2019-01-01 RX ORDER — DEXTROSE, SODIUM CHLORIDE, AND POTASSIUM CHLORIDE 5; .45; .15 G/100ML; G/100ML; G/100ML
INJECTION INTRAVENOUS CONTINUOUS
Status: DISCONTINUED | OUTPATIENT
Start: 2019-01-01 | End: 2019-01-01

## 2019-01-01 RX ORDER — ONDANSETRON 2 MG/ML
INJECTION INTRAMUSCULAR; INTRAVENOUS PRN
Status: DISCONTINUED | OUTPATIENT
Start: 2019-01-01 | End: 2019-01-01 | Stop reason: SDUPTHER

## 2019-01-01 RX ORDER — ONDANSETRON 4 MG/1
4 TABLET, ORALLY DISINTEGRATING ORAL EVERY 8 HOURS PRN
Status: DISCONTINUED | OUTPATIENT
Start: 2019-01-01 | End: 2019-01-01

## 2019-01-01 RX ORDER — POTASSIUM CHLORIDE 20 MEQ/1
20 TABLET, EXTENDED RELEASE ORAL 2 TIMES DAILY
Status: DISCONTINUED | OUTPATIENT
Start: 2019-01-01 | End: 2019-01-01 | Stop reason: HOSPADM

## 2019-01-01 RX ORDER — SODIUM CHLORIDE 0.9 % (FLUSH) 0.9 %
10 SYRINGE (ML) INJECTION 2 TIMES DAILY
Status: DISCONTINUED | OUTPATIENT
Start: 2019-01-01 | End: 2019-01-01 | Stop reason: HOSPADM

## 2019-01-01 RX ORDER — SODIUM CHLORIDE 9 MG/ML
INJECTION, SOLUTION INTRAVENOUS CONTINUOUS PRN
Status: DISCONTINUED | OUTPATIENT
Start: 2019-01-01 | End: 2019-01-01 | Stop reason: SDUPTHER

## 2019-01-01 RX ORDER — ROCURONIUM BROMIDE 10 MG/ML
INJECTION, SOLUTION INTRAVENOUS PRN
Status: DISCONTINUED | OUTPATIENT
Start: 2019-01-01 | End: 2019-01-01 | Stop reason: SDUPTHER

## 2019-01-01 RX ORDER — METOCLOPRAMIDE 5 MG/1
5 TABLET ORAL EVERY 6 HOURS PRN
Qty: 30 TABLET | Refills: 0 | DISCHARGE
Start: 2019-01-01

## 2019-01-01 RX ORDER — MONTELUKAST SODIUM 10 MG/1
10 TABLET ORAL NIGHTLY
Status: DISCONTINUED | OUTPATIENT
Start: 2019-01-01 | End: 2019-01-01 | Stop reason: HOSPADM

## 2019-01-01 RX ORDER — ACETAMINOPHEN 325 MG/1
650 TABLET ORAL EVERY 4 HOURS PRN
Status: DISCONTINUED | OUTPATIENT
Start: 2019-01-01 | End: 2019-01-01 | Stop reason: HOSPADM

## 2019-01-01 RX ORDER — 0.9 % SODIUM CHLORIDE 0.9 %
250 INTRAVENOUS SOLUTION INTRAVENOUS ONCE
Status: COMPLETED | OUTPATIENT
Start: 2019-01-01 | End: 2019-01-01

## 2019-01-01 RX ORDER — TORSEMIDE 20 MG/1
20 TABLET ORAL 2 TIMES DAILY
Status: DISCONTINUED | OUTPATIENT
Start: 2019-01-01 | End: 2019-01-01 | Stop reason: HOSPADM

## 2019-01-01 RX ORDER — DIPHENOXYLATE HYDROCHLORIDE AND ATROPINE SULFATE 2.5; .025 MG/1; MG/1
1 TABLET ORAL 4 TIMES DAILY PRN
Status: DISCONTINUED | OUTPATIENT
Start: 2019-01-01 | End: 2019-01-01 | Stop reason: HOSPADM

## 2019-01-01 RX ORDER — FUROSEMIDE 40 MG/1
40 TABLET ORAL 2 TIMES DAILY
Qty: 30 TABLET | Refills: 0 | DISCHARGE
Start: 2019-01-01 | End: 2019-01-01 | Stop reason: HOSPADM

## 2019-01-01 RX ORDER — CALCIUM POLYCARBOPHIL 625 MG 625 MG/1
625 TABLET ORAL DAILY
Status: DISCONTINUED | OUTPATIENT
Start: 2019-01-01 | End: 2019-01-01 | Stop reason: HOSPADM

## 2019-01-01 RX ORDER — CHOLECALCIFEROL (VITAMIN D3) 50 MCG
2000 TABLET ORAL NIGHTLY
Status: DISCONTINUED | OUTPATIENT
Start: 2019-01-01 | End: 2019-01-01 | Stop reason: HOSPADM

## 2019-01-01 RX ORDER — FUROSEMIDE 40 MG/1
40 TABLET ORAL DAILY
Status: DISCONTINUED | OUTPATIENT
Start: 2019-01-01 | End: 2019-01-01

## 2019-01-01 RX ADMIN — IOPAMIDOL 70 ML: 755 INJECTION, SOLUTION INTRAVENOUS at 19:01

## 2019-01-01 RX ADMIN — CHOLECALCIFEROL TAB 50 MCG (2000 UNIT) 2000 UNITS: 50 TAB at 20:22

## 2019-01-01 RX ADMIN — Medication 10 ML: at 10:13

## 2019-01-01 RX ADMIN — CEFTRIAXONE 1 G: 1 INJECTION, POWDER, FOR SOLUTION INTRAMUSCULAR; INTRAVENOUS at 23:07

## 2019-01-01 RX ADMIN — POTASSIUM CHLORIDE 20 MEQ: 20 TABLET, EXTENDED RELEASE ORAL at 20:22

## 2019-01-01 RX ADMIN — APIXABAN 5 MG: 5 TABLET, FILM COATED ORAL at 20:30

## 2019-01-01 RX ADMIN — POTASSIUM CHLORIDE 20 MEQ: 20 TABLET, EXTENDED RELEASE ORAL at 22:56

## 2019-01-01 RX ADMIN — Medication 10 ML: at 08:15

## 2019-01-01 RX ADMIN — CHOLESTYRAMINE 4 G: 4 POWDER, FOR SUSPENSION ORAL at 09:31

## 2019-01-01 RX ADMIN — POTASSIUM CHLORIDE 20 MEQ: 20 TABLET, EXTENDED RELEASE ORAL at 09:56

## 2019-01-01 RX ADMIN — CHOLESTYRAMINE 4 G: 4 POWDER, FOR SUSPENSION ORAL at 09:56

## 2019-01-01 RX ADMIN — DIGOXIN 125 MCG: 0.25 INJECTION INTRAMUSCULAR; INTRAVENOUS at 12:36

## 2019-01-01 RX ADMIN — MORPHINE SULFATE 2 MG: 2 INJECTION, SOLUTION INTRAMUSCULAR; INTRAVENOUS at 01:13

## 2019-01-01 RX ADMIN — POTASSIUM CHLORIDE 20 MEQ: 20 TABLET, EXTENDED RELEASE ORAL at 08:14

## 2019-01-01 RX ADMIN — Medication 10 ML: at 22:59

## 2019-01-01 RX ADMIN — Medication 10 ML: at 19:01

## 2019-01-01 RX ADMIN — APIXABAN 5 MG: 5 TABLET, FILM COATED ORAL at 20:22

## 2019-01-01 RX ADMIN — Medication 2 G: at 04:02

## 2019-01-01 RX ADMIN — Medication 2 G: at 20:30

## 2019-01-01 RX ADMIN — PANTOPRAZOLE SODIUM 40 MG: 40 TABLET, DELAYED RELEASE ORAL at 09:56

## 2019-01-01 RX ADMIN — CALCIUM POLYCARBOPHIL 625 MG: 625 TABLET, FILM COATED ORAL at 09:56

## 2019-01-01 RX ADMIN — Medication 10 ML: at 23:25

## 2019-01-01 RX ADMIN — PETROLATUM: 42 OINTMENT TOPICAL at 08:59

## 2019-01-01 RX ADMIN — Medication 0.6 MG: at 21:12

## 2019-01-01 RX ADMIN — AMIODARONE HYDROCHLORIDE 200 MG: 200 TABLET ORAL at 09:56

## 2019-01-01 RX ADMIN — APIXABAN 5 MG: 5 TABLET, FILM COATED ORAL at 10:25

## 2019-01-01 RX ADMIN — Medication 10 ML: at 20:46

## 2019-01-01 RX ADMIN — PANTOPRAZOLE SODIUM 40 MG: 40 TABLET, DELAYED RELEASE ORAL at 09:31

## 2019-01-01 RX ADMIN — MONTELUKAST SODIUM 10 MG: 10 TABLET, FILM COATED ORAL at 20:46

## 2019-01-01 RX ADMIN — MONTELUKAST SODIUM 10 MG: 10 TABLET, FILM COATED ORAL at 20:22

## 2019-01-01 RX ADMIN — DIGOXIN 125 MCG: 0.25 INJECTION INTRAMUSCULAR; INTRAVENOUS at 13:34

## 2019-01-01 RX ADMIN — FUROSEMIDE 40 MG: 40 TABLET ORAL at 16:33

## 2019-01-01 RX ADMIN — POTASSIUM CHLORIDE 20 MEQ: 20 TABLET, EXTENDED RELEASE ORAL at 20:46

## 2019-01-01 RX ADMIN — AMIODARONE HYDROCHLORIDE 200 MG: 200 TABLET ORAL at 09:31

## 2019-01-01 RX ADMIN — ONDANSETRON HYDROCHLORIDE 4 MG: 2 INJECTION, SOLUTION INTRAMUSCULAR; INTRAVENOUS at 20:28

## 2019-01-01 RX ADMIN — AMIODARONE HYDROCHLORIDE 200 MG: 200 TABLET ORAL at 22:56

## 2019-01-01 RX ADMIN — CEFAZOLIN SODIUM 2 G: 10 INJECTION, POWDER, FOR SOLUTION INTRAVENOUS at 20:03

## 2019-01-01 RX ADMIN — MORPHINE SULFATE 2 MG: 2 INJECTION, SOLUTION INTRAMUSCULAR; INTRAVENOUS at 00:02

## 2019-01-01 RX ADMIN — POTASSIUM CHLORIDE 20 MEQ: 20 TABLET, EXTENDED RELEASE ORAL at 20:30

## 2019-01-01 RX ADMIN — Medication 10 ML: at 08:59

## 2019-01-01 RX ADMIN — TORSEMIDE 20 MG: 20 TABLET ORAL at 10:25

## 2019-01-01 RX ADMIN — AMIODARONE HYDROCHLORIDE 1 MG/MIN: 50 INJECTION, SOLUTION INTRAVENOUS at 22:10

## 2019-01-01 RX ADMIN — Medication 10 ML: at 20:30

## 2019-01-01 RX ADMIN — CHOLECALCIFEROL TAB 50 MCG (2000 UNIT) 2000 UNITS: 50 TAB at 22:56

## 2019-01-01 RX ADMIN — SODIUM CHLORIDE: 9 INJECTION, SOLUTION INTRAVENOUS at 19:36

## 2019-01-01 RX ADMIN — PROPOFOL 40 MG: 10 INJECTION, EMULSION INTRAVENOUS at 20:15

## 2019-01-01 RX ADMIN — FUROSEMIDE 40 MG: 40 TABLET ORAL at 09:31

## 2019-01-01 RX ADMIN — Medication 10 ML: at 09:57

## 2019-01-01 RX ADMIN — FENTANYL CITRATE 50 MCG: 50 INJECTION, SOLUTION INTRAMUSCULAR; INTRAVENOUS at 22:52

## 2019-01-01 RX ADMIN — AMIODARONE HYDROCHLORIDE 150 MG: 50 INJECTION, SOLUTION INTRAVENOUS at 21:49

## 2019-01-01 RX ADMIN — CEFTRIAXONE 1 G: 1 INJECTION, POWDER, FOR SOLUTION INTRAMUSCULAR; INTRAVENOUS at 22:54

## 2019-01-01 RX ADMIN — CHOLESTYRAMINE 4 G: 4 POWDER, FOR SUSPENSION ORAL at 08:14

## 2019-01-01 RX ADMIN — FUROSEMIDE 40 MG: 40 TABLET ORAL at 08:14

## 2019-01-01 RX ADMIN — ROCURONIUM BROMIDE 25 MG: 10 SOLUTION INTRAVENOUS at 20:15

## 2019-01-01 RX ADMIN — Medication 2 G: at 12:37

## 2019-01-01 RX ADMIN — FENTANYL CITRATE 25 MCG: 50 INJECTION, SOLUTION INTRAMUSCULAR; INTRAVENOUS at 20:33

## 2019-01-01 RX ADMIN — APIXABAN 5 MG: 5 TABLET, FILM COATED ORAL at 08:30

## 2019-01-01 RX ADMIN — AMIODARONE HYDROCHLORIDE 200 MG: 200 TABLET ORAL at 20:30

## 2019-01-01 RX ADMIN — METOPROLOL TARTRATE 25 MG: 25 TABLET ORAL at 01:21

## 2019-01-01 RX ADMIN — CALCIUM POLYCARBOPHIL 625 MG: 625 TABLET, FILM COATED ORAL at 08:14

## 2019-01-01 RX ADMIN — Medication 10 ML: at 22:56

## 2019-01-01 RX ADMIN — PANTOPRAZOLE SODIUM 40 MG: 40 TABLET, DELAYED RELEASE ORAL at 08:14

## 2019-01-01 RX ADMIN — MONTELUKAST SODIUM 10 MG: 10 TABLET, FILM COATED ORAL at 20:30

## 2019-01-01 RX ADMIN — Medication 10 ML: at 00:03

## 2019-01-01 RX ADMIN — FENTANYL CITRATE 25 MCG: 50 INJECTION, SOLUTION INTRAMUSCULAR; INTRAVENOUS at 20:46

## 2019-01-01 RX ADMIN — FENTANYL CITRATE 50 MCG: 50 INJECTION INTRAMUSCULAR; INTRAVENOUS at 21:45

## 2019-01-01 RX ADMIN — SODIUM CHLORIDE 500 ML: 9 INJECTION, SOLUTION INTRAVENOUS at 21:45

## 2019-01-01 RX ADMIN — CHOLECALCIFEROL TAB 50 MCG (2000 UNIT) 2000 UNITS: 50 TAB at 20:30

## 2019-01-01 RX ADMIN — SODIUM CHLORIDE 500 ML: 9 INJECTION, SOLUTION INTRAVENOUS at 17:26

## 2019-01-01 RX ADMIN — AMIODARONE HYDROCHLORIDE 0.5 MG/MIN: 50 INJECTION, SOLUTION INTRAVENOUS at 04:08

## 2019-01-01 RX ADMIN — Medication 3 MG: at 21:12

## 2019-01-01 RX ADMIN — FUROSEMIDE 40 MG: 40 TABLET ORAL at 09:56

## 2019-01-01 RX ADMIN — ROCURONIUM BROMIDE 10 MG: 10 SOLUTION INTRAVENOUS at 20:43

## 2019-01-01 RX ADMIN — LIDOCAINE HYDROCHLORIDE 100 MG: 20 INJECTION, SOLUTION EPIDURAL; INFILTRATION; INTRACAUDAL; PERINEURAL at 20:15

## 2019-01-01 RX ADMIN — DEXAMETHASONE SODIUM PHOSPHATE 10 MG: 4 INJECTION, SOLUTION INTRAMUSCULAR; INTRAVENOUS at 20:28

## 2019-01-01 RX ADMIN — APIXABAN 5 MG: 5 TABLET, FILM COATED ORAL at 12:37

## 2019-01-01 RX ADMIN — Medication 10 ML: at 09:01

## 2019-01-01 RX ADMIN — SODIUM CHLORIDE: 9 INJECTION, SOLUTION INTRAVENOUS at 22:57

## 2019-01-01 RX ADMIN — AMIODARONE HYDROCHLORIDE 200 MG: 200 TABLET ORAL at 20:22

## 2019-01-01 RX ADMIN — AMIODARONE HYDROCHLORIDE 200 MG: 200 TABLET ORAL at 08:14

## 2019-01-01 RX ADMIN — FENTANYL CITRATE 50 MCG: 50 INJECTION, SOLUTION INTRAMUSCULAR; INTRAVENOUS at 20:15

## 2019-01-01 RX ADMIN — CALCIUM POLYCARBOPHIL 625 MG: 625 TABLET, FILM COATED ORAL at 09:31

## 2019-01-01 RX ADMIN — MONTELUKAST SODIUM 10 MG: 10 TABLET, FILM COATED ORAL at 22:56

## 2019-01-01 RX ADMIN — CHOLECALCIFEROL TAB 50 MCG (2000 UNIT) 2000 UNITS: 50 TAB at 20:46

## 2019-01-01 RX ADMIN — SODIUM CHLORIDE: 9 INJECTION, SOLUTION INTRAVENOUS at 18:44

## 2019-01-01 RX ADMIN — ENOXAPARIN SODIUM 40 MG: 40 INJECTION SUBCUTANEOUS at 09:56

## 2019-01-01 RX ADMIN — POTASSIUM CHLORIDE 20 MEQ: 20 TABLET, EXTENDED RELEASE ORAL at 09:31

## 2019-01-01 RX ADMIN — DILTIAZEM HYDROCHLORIDE 2.5 MG/HR: 5 INJECTION INTRAVENOUS at 14:03

## 2019-01-01 RX ADMIN — METOPROLOL TARTRATE 25 MG: 25 TABLET ORAL at 20:46

## 2019-01-01 RX ADMIN — POTASSIUM CHLORIDE, DEXTROSE MONOHYDRATE AND SODIUM CHLORIDE: 150; 5; 450 INJECTION, SOLUTION INTRAVENOUS at 21:49

## 2019-01-01 RX ADMIN — SODIUM CHLORIDE 250 ML: 9 INJECTION, SOLUTION INTRAVENOUS at 12:36

## 2019-01-01 RX ADMIN — Medication 10 ML: at 13:34

## 2019-01-01 ASSESSMENT — PAIN DESCRIPTION - FREQUENCY
FREQUENCY: CONTINUOUS
FREQUENCY: CONTINUOUS

## 2019-01-01 ASSESSMENT — PAIN DESCRIPTION - PROGRESSION
CLINICAL_PROGRESSION: GRADUALLY IMPROVING
CLINICAL_PROGRESSION: NOT CHANGED
CLINICAL_PROGRESSION: NOT CHANGED

## 2019-01-01 ASSESSMENT — PULMONARY FUNCTION TESTS
PIF_VALUE: 16
PIF_VALUE: 16
PIF_VALUE: 19
PIF_VALUE: 16
PIF_VALUE: 16
PIF_VALUE: 26
PIF_VALUE: 15
PIF_VALUE: 12
PIF_VALUE: 4
PIF_VALUE: 5
PIF_VALUE: 16
PIF_VALUE: 16
PIF_VALUE: 9
PIF_VALUE: 1
PIF_VALUE: 1
PIF_VALUE: 16
PIF_VALUE: 17
PIF_VALUE: 15
PIF_VALUE: 16
PIF_VALUE: 20
PIF_VALUE: 16
PIF_VALUE: 12
PIF_VALUE: 16
PIF_VALUE: 19
PIF_VALUE: 16
PIF_VALUE: 1
PIF_VALUE: 16
PIF_VALUE: 0
PIF_VALUE: 4
PIF_VALUE: 15
PIF_VALUE: 3
PIF_VALUE: 16
PIF_VALUE: 3
PIF_VALUE: 16
PIF_VALUE: 16
PIF_VALUE: 11
PIF_VALUE: 16
PIF_VALUE: 6
PIF_VALUE: 0
PIF_VALUE: 16
PIF_VALUE: 1
PIF_VALUE: 18
PIF_VALUE: 16
PIF_VALUE: 12
PIF_VALUE: 16
PIF_VALUE: 30
PIF_VALUE: 20
PIF_VALUE: 15
PIF_VALUE: 16
PIF_VALUE: 15
PIF_VALUE: 12
PIF_VALUE: 12
PIF_VALUE: 16
PIF_VALUE: 2
PIF_VALUE: 11
PIF_VALUE: 16
PIF_VALUE: 1
PIF_VALUE: 5

## 2019-01-01 ASSESSMENT — PAIN SCALES - GENERAL
PAINLEVEL_OUTOF10: 0
PAINLEVEL_OUTOF10: 7
PAINLEVEL_OUTOF10: 0
PAINLEVEL_OUTOF10: 5
PAINLEVEL_OUTOF10: 3
PAINLEVEL_OUTOF10: 0
PAINLEVEL_OUTOF10: 0
PAINLEVEL_OUTOF10: 8
PAINLEVEL_OUTOF10: 0
PAINLEVEL_OUTOF10: 10
PAINLEVEL_OUTOF10: 0
PAINLEVEL_OUTOF10: 5
PAINLEVEL_OUTOF10: 0

## 2019-01-01 ASSESSMENT — ENCOUNTER SYMPTOMS
ABDOMINAL PAIN: 0
COLOR CHANGE: 0
BACK PAIN: 1
SHORTNESS OF BREATH: 0
COUGH: 0
WHEEZING: 0
SHORTNESS OF BREATH: 0
CONSTIPATION: 0
ABDOMINAL PAIN: 0
COUGH: 0
NAUSEA: 0
VOMITING: 0
DIARRHEA: 0

## 2019-01-01 ASSESSMENT — PAIN DESCRIPTION - ORIENTATION
ORIENTATION: LEFT

## 2019-01-01 ASSESSMENT — PAIN DESCRIPTION - DESCRIPTORS
DESCRIPTORS: SHARP
DESCRIPTORS: DISCOMFORT;CONSTANT;SHARP

## 2019-01-01 ASSESSMENT — PAIN DESCRIPTION - LOCATION
LOCATION: HIP
LOCATION: HIP;SHOULDER

## 2019-01-01 ASSESSMENT — PAIN DESCRIPTION - PAIN TYPE
TYPE: ACUTE PAIN
TYPE: ACUTE PAIN
TYPE: SURGICAL PAIN
TYPE: SURGICAL PAIN

## 2019-01-01 ASSESSMENT — PAIN DESCRIPTION - ONSET
ONSET: ON-GOING
ONSET: SUDDEN

## 2019-02-11 PROBLEM — I82.512 CHRONIC DEEP VEIN THROMBOSIS (DVT) OF FEMORAL VEIN OF LEFT LOWER EXTREMITY (HCC): Status: ACTIVE | Noted: 2019-01-01

## 2019-04-13 PROBLEM — S72.142A CLOSED INTERTROCHANTERIC FRACTURE OF LEFT FEMUR, INITIAL ENCOUNTER (HCC): Status: ACTIVE | Noted: 2019-01-01

## 2019-04-14 PROBLEM — S42.212A FX HUMERAL NECK, LEFT, CLOSED, INITIAL ENCOUNTER: Status: ACTIVE | Noted: 2019-01-01

## 2019-04-14 PROBLEM — K92.2 GI BLEED: Status: RESOLVED | Noted: 2018-01-01 | Resolved: 2019-01-01

## 2019-04-14 PROBLEM — I48.91 ATRIAL FIBRILLATION (HCC): Status: ACTIVE | Noted: 2017-11-07

## 2019-04-14 NOTE — H&P
record and discussion with the emergency department nurse practitioner. The patient's , Pan Weiss was called at [phone]. He states that she has been falling a lot. She has been \"tripping over things. \" she fell last weak and hurt her shoulder. Yesterday she fell again and hurt her left hip. Last hospital admission:   Admit date: 5/10/2018, Discharge date and time: 5/12/2018  304 PM   60-year-old  woman admitted with acute lower GI bleed  Patient on anticoagulant therapy  Required transfusion  Electrolyte imbalance was corrected with appropriate replacements  Bleeding stopped  Discharged home  Discontinued anticoagulants for the time being    ED course:   Initial blood work and imaging studies performed. Admission recommended by ED physician. Case discussed with ED provider. Meds in ED consisted of the following:  Medications   cefTRIAXone (ROCEPHIN) 1 g in dextrose 5 % 50 mL IVPB (vial-mate) (1 g Intravenous New Bag 4/13/19 6254)   0.9 % sodium chloride bolus (0 mLs Intravenous Stopped 4/13/19 7743)   fentaNYL (SUBLIMAZE) injection 50 mcg (50 mcg Intravenous Given 4/13/19 7418)   fentaNYL (SUBLIMAZE) injection 50 mcg (50 mcg Intravenous Given 4/13/19 9832)       Past Medical History:   Diagnosis Date    Acute blood loss anemia 11/09/2017    D/t acute UGI bleed from duodenal ulcer    Acute upper GI bleed 11/09/2017    [facility]   and 5/10/2018    Atrial fibrillation ([facility] [address] 11/07/2017    sees Dr. Tabares Sin [facility])     bilateral breast cancer    CAD (coronary artery disease)     12/7/2018  denies any cardiac symptoms/issues.     Chronic deep vein thrombosis (DVT) of tibial vein of right lower extremity (HCC) 11/7/2017    Chronic diastolic CHF (congestive heart failure) (HCC)     Chronic pulmonary embolism ([address] 11/7/2017    Colon cancer ([facility] [address]     Full dentures     History of blood transfusion 11/2017    [place] [facility]    History of fracture of right shoulder 12/2013    History of peptic ulcer disease 11/09/2017    History of pulmonary embolism 11/07/2017    Hx of blood clots     right leg, and right arm  - for US of Right arm 12-18-17    Hypertension     Osteoarthritis of right shoulder 10/2017    Psoriasis     10/2017 scalp reddened, area on left abdomen dry with small scab    Pulmonary hypertension ([address] 12/04/2017    Seasonal allergies     Skin cancer     affecting face and left hand    Thalassemia trait     Wears glasses        Past Surgical History:   Procedure Laterality Date    BREAST LUMPECTOMY Right     for cancer, no further treatment    BREAST LUMPECTOMY Left     no further treatment    COLON SURGERY  01/24/2018    rt hemicolectomy, duodenal resection.  COLONOSCOPY  12/28/2017    polypectomy    COLONOSCOPY  12/13/2018    COLONOSCOPY N/A 12/13/2018    COLONOSCOPY DIAGNOSTIC performed by Bhavin Zaldivar MD at [address] [place] [address]  12/28/2017    SHOULDER ARTHROPLASTY Right 10/23/2017    osteoarthritis     TONSILLECTOMY      UPPER GASTROINTESTINAL ENDOSCOPY  11/09/2017    D/t UGI bleed; was found to have a duodenal ulcer    UPPER GASTROINTESTINAL ENDOSCOPY  12/28/2017    with biopsy, banding of varices x4    VENA CAVA FILTER PLACEMENT  11/09/2017    In the setting of DVT and PE complicated by acute UGI bleed, duodenal ulcer, removed. Family History   Problem Relation Age of Onset    Heart Attack Mother     Stroke Father     Other Sister         aneurysm    Stroke Brother     Other Sister         lupus    Other Brother         back trouble    Heart Attack Brother     Cancer Brother        Social History  Patient lives at home on hospice. Employment: none  Illicit drug use- denies   TOBACCO:   reports that she quit smoking about 20 years ago. Her smoking use included cigarettes. She quit after 20.00 years of use.  She has never used smokeless tobacco.  ETOH:   reports that she does not weakness, diaphoresis, increase in thirst, loss of appetite  Eyes: vision change   Ears, Nose, Mouth, Throat: hearing loss, nasal congestion, sores in the mouth  Cardiovascular: chest pain, chest heaviness, palpitations  Respiratory: shortness of breath, wheezing, coughing  Gastrointestinal: abdominal pain, nausea, vomiting, diarrhea, constipation, melena, hematochezia, hematemesis  Genitourinary: dysuria, hematuria, increase in frequency  Musculoskeletal: lower extremity edema, myalgias, arthralgias, back pain  Integumentary: rashes, itching   Neurological: headache, lightheadedness, dizziness, confusion, syncope, numbness, tingling, focal weakness  Psychiatric: depression, suicidal ideation, anxiety  Endocrine: unintentional weight change  Hematologic/Lymphatic: lymphadenopathy, easy bruising, easy bleeding   Allergic/Immunologic: recurrent infections      Objective  VITALS:  /87   Pulse 130   Temp 98 °F (36.7 °C) (Oral)   Resp 16   Ht 5' 1\" (1.549 m)   Wt 128 lb 1.6 oz (58.1 kg)   SpO2 99%   BMI 24.20 kg/m²     Physical Exam:  General: awake, alert, oriented to person, place, not time or purpose, appears stated age, cooperative, no acute distress, pleasant, appropriate mood  Eyes: conjunctivae/corneas clear, sclera non icteric, EOMI  Ears: no obvious scars, no lesions, no masses, hearing intact  Mouth: mucous membranes moist, no obvious oral sores  Head: normocephalic, atraumatic  Neck: no JVD, no adenopathy, no thyromegaly, neck is supple, trachea is midline  Back: ROM normal, no CVA tenderness.   Chest: no pain on palpation  Lungs: clear to auscultation bilaterally, without rhonchi, crackle, wheezing, or rale, no retractions or use of accessory muscles  Heart: regular rate and regular rhythm, no murmur, normal S1, S2  Abdomen: soft, non-tender; bowel sounds normal; no masses, no organomegaly, glasgow  Extremities: 2+ pitting lower extremity edema, externally rotated left hip, fractured left humerus, no cyanosis, no clubbing, 2+ pedal pulses palpated  Skin: normal color, normal texture, normal turgor, no rashes, no lesions  Neurologic:5/5 muscle strength throughout, normal muscle tone throughout, face symmetric, hearing intact, tongue midline, speech appropriate without slurring, sensation to fine touch intact in upper and lower extremities    Labs-   Lab Results   Component Value Date    WBC 8.0 04/13/2019    HGB 9.7 (L) 04/13/2019    HCT 29.9 (L) 04/13/2019     04/13/2019     04/13/2019    K 3.9 04/13/2019     04/13/2019    CREATININE 0.8 04/13/2019    BUN 14 04/13/2019    CO2 23 04/13/2019    GLUCOSE 89 04/13/2019    ALT 41 (H) 04/13/2019    AST 82 (H) 04/13/2019    INR 1.3 04/13/2019     Lab Results   Component Value Date    CKTOTAL 19 (L) 11/07/2017    CKMB 1.0 11/07/2017    TROPONINI <0.01 04/13/2019       Recent Radiological Studies:  XR SHOULDER LEFT (MIN 2 VIEWS)   Final Result      Impacted fracture involving left humeral neck. XR HIP 2-3 VW W PELVIS LEFT   Final Result      Intertrochanteric left hip fracture. XR CHEST PORTABLE   Final Result      1. Limited examination due to rotation. 2. Opacities overlying left costophrenic sulcus which may be related   to overlying soft tissue summation. Right lower lobe pneumonia,   atelectasis, or effusion is possible. Repeat chest radiograph may be   warranted for further evaluation. CT Head WO Contrast   Final Result      1. No evidence of acute intracranial hemorrhage or edema. 2. Chronic areas of stroke are seen involving right and left   cerebellar hemispheres. CT Facial Bones WO Contrast   Final Result      1. Mildly displaced fracture involving right nasal bone of uncertain   chronicity. 2. Globes and orbits are intact. CT Cervical Spine WO Contrast   Final Result   1. Degenerative changes are seen predominant in the facet joints at   C3-4 more on the left than on the right.       2. Priority: High    Fx humeral neck, left, closed, initial encounter 04/14/2019     Priority: Medium    Chronic diastolic CHF (congestive heart failure) (HCC)      Priority: Medium    Chronic anticoagulation      Priority: Medium    Carcinoid tumor      Priority: Medium    Primary adenocarcinoma of ascending colon ([address] 01/24/2018     Priority: Medium    Anemia due to blood loss      Priority: Medium    Thalassemia trait 12/03/2017     Priority: Medium    S/P IVC filter 11/09/2017     Priority: Medium    H/o Duodenal ulcer 11/09/2017     Priority: Medium    History of peptic ulcer disease 11/09/2017     Priority: Medium    Chronic pulmonary embolism ([address] 11/07/2017     Priority: Medium    Persistent atrial fibrillation ([address] 11/07/2017     Priority: Medium    History of pulmonary embolism 11/07/2017     Priority: Medium    Chronic deep vein thrombosis (DVT) of tibial vein of right lower extremity ([address] 11/07/2017     Priority: Medium    Colon cancer ([address]     CAD (coronary artery disease)      12/7/2018  denies any cardiac symptoms/issues.  Chronic deep vein thrombosis (DVT) of femoral vein of left lower extremity ([address] 02/11/2019    Hypertension     Malignant neoplasm of hepatic flexure ([address]     Pulmonary hypertension ([address] 12/04/2017    B12 deficiency 12/03/2017    Atrial fibrillation ([address] 11/07/2017     sees Dr. Monika Doyle S/P reverse total shoulder arthroplasty, right 10/23/2017    Essential hypertension 10/23/2017    Psoriasis 10/23/2017       Plan  · Frequent falls with intertrochanteric left hip fracture and impacted fracture involving left humeral neck: Orthopedic surgery consultation, IV Morphine for pain- post-op pain control per ortho, PT/OT eventually. Benefits out weight risks to have surgery-- this was discussed w/ the . · Chronic DVT/PE-IVC filter removed: Hold Eliquis for now. Restart after surgery.    · H/o colon cancer: She was under hospice care at home-- consult them. · Continue home medications. · Follow labs  · DVT prophylaxis. · Please see orders for further management and care. ·  for discharge planning  · Discharge plan: JUAN MANUEL Gutierrez How DO  4/14/2019  6:24 AM    Hospital Cell (always forwarded to covering physician): [phone]. I can be reached through [employer] as well. NOTE:  This report was transcribed using voice recognition software. Every effort was made to ensure accuracy; however, inadvertent computerized transcription errors may be present.

## 2019-04-14 NOTE — CONSULTS
[address] [address]                                  CONSULTATION    PATIENT NAME: Cheyanne DE PAZ                      :        1940  MED REC NO:   [id number]                            ROOM:       9448  ACCOUNT NO:   [de-identified]                           ADMIT DATE: 2019  PROVIDER:     Shea Solorzano    CONSULT DATE:  2019    REASON FOR CONSULTATION:  Left proximal humerus fracture and left hip  fracture. CHIEF COMPLAINT:  Left hip pain and left shoulder pain. REQUESTING PHYSICIAN:  Daily Boudreaux DO. PAIN SCALE SCORE:  6/10. HISTORY OF PRESENT ILLNESS:  This is a 71-year-old female, who was sent  to the ER for evaluation after a fall. She had a slip and fall to the  ground. She hit her head and also landed on her left side. She had an  additional fall after getting out of bed. She then had difficulty  ambulating and had increase pain. She presented to the ER and actually  was taken _____ left hip fracture and left shoulder fracture. Pain is  improved with rest.  Pain is worse with activity. She has been using a  sling. She is taking pain medications as needed. She is feeling about  the same. She has been admitted to Medicine and Orthopedics was  counseled. PAST MEDICAL HISTORY:  Pulmonary hypertension, PE, colon cancer, GI  bleed, atrial fibrillation, breast cancer, DVT, CHF, peptic ulcer  disease, right shoulder fracture, hypertension, thalassemia trait, and  wears glasses. PAST SURGICAL HISTORY:  Tonsillectomy, lumpectomy, shoulder replacement,  vena cava filter placement, and colonoscopy. SOCIAL HISTORY:  Quit smoking 20 years ago. Denies alcohol or drugs. FAMILY HISTORY:  Cancer, heart attack, and stroke. ALLERGIES:  No known drug allergies. PHYSICAL EXAMINATION:  GENERAL:  In no acute distress. Awake, alert, and responds to commands.   HEENT:  Head: Normocephalic and atraumatic. Eyes:  Extraocular  movements intact. Sclerae are clear. NECK:  Midline. Supple. CARDIOVASCULAR:  No apparent abnormalities. CHEST:  In no acute distress. ABDOMEN:  Nontender to palpation. EXTREMITIES:  Left upper extremity seems bruising about her left  shoulder with sensation more grossly intact distally. She has  tenderness about her shoulder. Extremities was warm and well perfused. Left lower extremity demonstrates no bruising, laceration, or erythema  about her hip. Sensation more grossly intact distally. Leg is warm and  well perfused. Right lower extremity and right upper extremity  demonstrates sensation more grossly intact with nontender to palpation. Extremities are warm and well perfused. DIAGNOSTIC STUDIES:  X-ray of left shoulder and left hip reviewed. Demonstrates displaced left proximal humerus fracture with overall good  alignment and displaced left intertrochanteric hip fracture. ASSESSMENT:  A 76-year-old female diagnosed with left intertrochanteric  hip fracture and left proximal humerus fracture. PLAN:  The patient will be admitted to Medicine. She should be n.p.o. She will need medical clearance. She will undergo closed treatment in a  sling for her left proximal humerus fracture. She will undergo surgery  for her left intertrochanteric hip fracture once medically cleared. This will be a closed versus open reduction and internal fixation. She  will be bedrest.  Weaver was placed. She will undergo two units Of blood  transfusion due to low hemoglobin prior to her hip surgery. Of note, greater than 50 minutes was spent on the floor with patient  performing history and physical, reviewing labs and imaging, and  discussing plan. Of note, greater than half the time was spent with  counseling and coordination of care.         Penny Alvarez    D: 04/14/2019 9:47:57       T: 04/14/2019 10:17:45     TERRANCE/[email or web address]  Job#: [id number]

## 2019-04-14 NOTE — PROGRESS NOTES
Orthopaedic Surgery Progress Note  Mandy Ash MD      Plan:  Plan for OR on 4/15 if cleared  NPO after 8 AM

## 2019-04-14 NOTE — PLAN OF CARE
Problem: Pain:  Goal: Control of acute pain  Description  Control of acute pain  Outcome: Ongoing     Problem: Falls - Risk of:  Goal: Absence of physical injury  Description  Absence of physical injury  Outcome: Ongoing

## 2019-04-14 NOTE — ED NOTES
Patient  states that patient fall # 1 was Wednesday and fall #2 Today     Ceci Crow RN  77/37/59 [id number]

## 2019-04-14 NOTE — PROGRESS NOTES
Blood administration is not prompting dual sign off. [facility] called for support. Double check was done at bedside with charge RN [facility].  Blood infusing    Electronically signed by Tana Hamlin RN on 4/14/2019 at 12:07 PM

## 2019-04-14 NOTE — PROGRESS NOTES
Dr. Newton Host called states to leave patient NPO and contact him directly after cardiology rounds to assess surgical plan for tonight. Patient and family in room and updated.

## 2019-04-14 NOTE — ANESTHESIA PRE PROCEDURE
montelukast (SINGULAIR) 10 MG tablet Take 10 mg by mouth nightly  4/25/17   Historical Provider, MD       Current medications:    Current Facility-Administered Medications   Medication Dose Route Frequency Provider Last Rate Last Dose    morphine (PF) injection 2 mg  2 mg Intravenous Q4H PRN Woody Bae, DO   2 mg at 04/14/19 0113    sodium chloride flush 0.9 % injection 10 mL  10 mL Intravenous 2 times per day Woody Bae, DO        sodium chloride flush 0.9 % injection 10 mL  10 mL Intravenous PRN Woody Bae, DO   10 mL at 04/14/19 1013    magnesium hydroxide (MILK OF MAGNESIA) 400 MG/5ML suspension 30 mL  30 mL Oral Daily PRN Woody Bae, DO        ondansetron (ZOFRAN) injection 4 mg  4 mg Intravenous Q6H PRN Woody Bae, DO        acetaminophen (TYLENOL) tablet 650 mg  650 mg Oral Q4H PRN Woody Bae, DO        montelukast (SINGULAIR) tablet 10 mg  10 mg Oral Nightly Woody Bae, DO        vitamin D tablet 2,000 Units  2,000 Units Oral Nightly Woody Bae, DO        pantoprazole (PROTONIX) tablet 40 mg  40 mg Oral Daily Woody Bae, DO   Stopped at 04/14/19 0801    metoprolol tartrate (LOPRESSOR) tablet 25 mg  25 mg Oral BID Woody Bae, DO   Stopped at 04/14/19 0800    furosemide (LASIX) tablet 40 mg  40 mg Oral Daily Woody Bae, DO   Stopped at 04/14/19 0800    potassium chloride (KLOR-CON M) extended release tablet 20 mEq  20 mEq Oral BID Woody Bae, DO   Stopped at 04/14/19 0801    polycarbophil (FIBERCON) tablet 625 mg  625 mg Oral Daily Woody Bae, DO   Stopped at 04/14/19 0801    phenylephrine-mineral oil-petrolatum (PREPARATION H) rectal ointment   Rectal TID PRN Woody Bae, DO        cholestyramine (QUESTRAN) packet 4 g  1 packet Oral Daily Woody Bae, DO   Stopped at 04/14/19 0800    loperamide (IMODIUM) capsule 2 mg  2 mg Oral 4x Daily PRN Woody Bae, DO        diphenoxylate-atropine (LOMOTIL) 2.5-0.025 MG per tablet 1 tablet  1 tablet Oral 4x Daily PRN Woody Bae DO        ceFAZolin (ANCEF) 2 g in dextrose 5 % 100 mL IVPB  2 g Intravenous See Admin Instructions Marlin Watson MD        0.9 % sodium chloride bolus  250 mL Intravenous Once Marlin Watson MD        0.9 % sodium chloride infusion   Intravenous Continuous Marlin Watson MD 80 mL/hr at 04/14/19 1047      digoxin (LANOXIN) injection 125 mcg  125 mcg Intravenous Once Woody Bae DO        cefTRIAXone (ROCEPHIN) 1 g in dextrose 5 % 50 mL IVPB (vial-mate)  1 g Intravenous Q24H Woody Bae DO   Stopped at 04/13/19 0113       Allergies:  No Known Allergies    Problem List:    Patient Active Problem List   Diagnosis Code    S/P reverse total shoulder arthroplasty, right Z96.611    Essential hypertension I10    Psoriasis L40.9    Chronic pulmonary embolism (HCC) I27.82    Persistent atrial fibrillation (HCC) I48.1    History of pulmonary embolism Z86.711    Chronic deep vein thrombosis (DVT) of tibial vein of right lower extremity (HCC) I82.541    S/P IVC filter Z95.828    Anemia due to blood loss D50.0    Thalassemia trait D56.3    B12 deficiency E53.8    H/o Duodenal ulcer K26.9    Pulmonary hypertension (HCC) I27.20    Primary adenocarcinoma of ascending colon (HCC) C18.2    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    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    Chronic anticoagulation Z79.01    Hypertension I10    History of peptic ulcer disease Z87.11    Chronic diastolic CHF (congestive heart failure) (HCC) I50.32    Chronic deep vein thrombosis (DVT) of femoral vein of left lower extremity (HCC) I82.512    Closed intertrochanteric fracture of left femur, initial encounter ([facility]) S72.142A    Colon cancer (HCC) C18.9    CAD (coronary artery disease) I25.10    Atrial fibrillation ([facility]) I48.91    Fx humeral neck, left, closed, initial encounter G04.985A       Past Medical History:        Diagnosis Date    Acute blood loss anemia 11/09/2017    D/t acute UGI bleed from duodenal ulcer    Acute upper GI bleed 11/09/2017    Soon after starting Eliquis [address]   and 5/10/2018    Atrial fibrillation ([address] 11/07/2017    sees Dr. Riddhi Walter [facility])     bilateral breast cancer    CAD (coronary artery disease)     12/7/2018  denies any cardiac symptoms/issues.  Chronic deep vein thrombosis (DVT) of tibial vein of right lower extremity (HCC) 11/7/2017    Chronic diastolic CHF (congestive heart failure) (HCC)     Chronic pulmonary embolism ([address] 11/7/2017    Colon cancer ([address]     Full dentures     History of blood transfusion 11/2017    [place] [facility]    History of fracture of right shoulder 12/2013    History of peptic ulcer disease 11/09/2017    History of pulmonary embolism 11/07/2017    Hx of blood clots     right leg, and right arm  - for US of Right arm 12-18-17    Hypertension     Osteoarthritis of right shoulder 10/2017    Psoriasis     10/2017 scalp reddened, area on left abdomen dry with small scab    Pulmonary hypertension ([address] 12/04/2017    Seasonal allergies     Skin cancer     affecting face and left hand    Thalassemia trait     Wears glasses        Past Surgical History:        Procedure Laterality Date    BREAST LUMPECTOMY Right     for cancer, no further treatment    BREAST LUMPECTOMY Left     no further treatment    COLON SURGERY  01/24/2018    rt hemicolectomy, duodenal resection.     COLONOSCOPY  12/28/2017    polypectomy    COLONOSCOPY  12/13/2018    COLONOSCOPY N/A 12/13/2018    COLONOSCOPY DIAGNOSTIC performed by José Miguel Pappas MD at [address] [place] [address]  12/28/2017    SHOULDER ARTHROPLASTY Right 10/23/2017    osteoarthritis     TONSILLECTOMY      UPPER GASTROINTESTINAL ENDOSCOPY  11/09/2017    D/t UGI bleed; was found to have a duodenal ulcer    UPPER GASTROINTESTINAL ENDOSCOPY  12/28/2017    with biopsy, banding of varices x4   Lillie 2017    In the setting of DVT and PE complicated by acute UGI bleed, duodenal ulcer, removed. Social History:    Social History     Tobacco Use    Smoking status: Former Smoker     Years: 20.00     Types: Cigarettes     Last attempt to quit: 5/10/1998     Years since quittin.9    Smokeless tobacco: Never Used   Substance Use Topics    Alcohol use: No                                Counseling given: Not Answered      Vital Signs (Current):   Vitals:    19 0042 19 0714 19 1130 19 1145   BP: 115/87 82/63 (!) 95/57 96/60   Pulse: 130 101 135 122   Resp: 16 18 16 16   Temp: 98 °F (36.7 °C) 98.9 °F (37.2 °C) 98.6 °F (37 °C) 98.6 °F (37 °C)   TempSrc: Oral      SpO2: 99% 93% 94% 94%   Weight: 128 lb 1.6 oz (58.1 kg)      Height:                                                  BP Readings from Last 3 Encounters:   19 96/60   19 90/63   18 (!) 100/55       NPO Status:                           greater than 8 hours                                                      BMI:   Wt Readings from Last 3 Encounters:   19 128 lb 1.6 oz (58.1 kg)   19 125 lb (56.7 kg)   18 125 lb (56.7 kg)     Body mass index is 24.2 kg/m². CBC:   Lab Results   Component Value Date    WBC 5.8 2019    RBC 3.49 2019    HGB 8.6 2019    HCT 26.3 2019    MCV 75.4 2019    RDW 18.5 2019     2019       CMP:   Lab Results   Component Value Date     2019    K 3.7 2019     2019    CO2 25 2019    BUN 15 2019    CREATININE 0.7 2019    GFRAA >60 2019    LABGLOM >60 2019    GLUCOSE 82 2019    PROT 3.8 2019    CALCIUM 7.0 2019    BILITOT 0.7 2019    ALKPHOS 251 2019    AST 49 2019    ALT 35 2019       POC Tests: No results for input(s): POCGLU, POCNA, POCK, POCCL, POCBUN, POCHEMO, POCHCT in the last 72 hours.     Coags:   Lab Results Component Value Date    PROTIME 14.9 04/13/2019    INR 1.3 04/13/2019    APTT 28.8 01/18/2019       HCG (If Applicable): No results found for: PREGTESTUR, PREGSERUM, HCG, HCGQUANT     ABGs: No results found for: PHART, PO2ART, LIQ8JUZ, TYS8DWF, BEART, A5LENQJF     Type & Screen (If Applicable):  No results found for: LABABO, [address]    Anesthesia Evaluation  Patient summary reviewed  Airway: Mallampati: III  TM distance: >3 FB   Neck ROM: full  Mouth opening: > = 3 FB Dental:          Pulmonary:Negative Pulmonary ROS breath sounds clear to auscultation                             Cardiovascular:    (+) hypertension:, CAD:, dysrhythmias: atrial fibrillation, CHF:, hyperlipidemia        Rhythm: irregular             Beta Blocker:  Dose within 24 Hrs         Neuro/Psych:   Negative Neuro/Psych ROS              GI/Hepatic/Renal:   (+) PUD, liver disease:,           Endo/Other: Negative Endo/Other ROS                    Abdominal:           Vascular:                                      Anesthesia Plan      general     ASA 4     (Pt with Afib rate 130's. Awaiting medical clearance. Cardiac clearance on the chart. Ok to proceed with surgery)  Induction: intravenous. Anesthetic plan and risks discussed with patient. Plan discussed with CRNA. Viral Montesinos MD   4/14/2019     DOS STAFF ADDENDUM:    Patient seen and chart reviewed. No interval change in history or exam.   Anesthesia plan discussed, risk/benefits addressed. Patient's concerns and questions answered.      Dougie Trujillo DO  April 15, 2019  8:03 PM

## 2019-04-14 NOTE — ED NOTES
Bed: 23  Expected date:   Expected time:   Means of arrival:   Comments:  EMS     Deny Goncalves RN  24/43/54 2107

## 2019-04-14 NOTE — PROGRESS NOTES
Dr. Melany Carrion updated on Dr. Shant Vyas surgical plan and need for cardiac clearance this afternoon.

## 2019-04-14 NOTE — PROGRESS NOTES
Called Pt  and notified of bed change    Electronically signed by Vincent Aguilar RN on 4/14/2019 at 1:26 PM

## 2019-04-14 NOTE — ED PROVIDER NOTES
This is a 67year old female with a PMH of Pulmonary HTN, PE and Colon Cancer who presents to the ED for evlauation after a fall. The patient states that she takes Eliquis for her PEs. The patient remarks that yesterday she slipped and fell to the ground. She states that yesterday she hit her head but did not lose consciousness. The patient states at that time she was able to get back to her feet without difficulties. The patient remarks that today she was going to bed and slipped and fell to the ground. She states that she landed on her left side and developed immediate pain to her left hip and leg. She states that she was unable to get to her feet. She states that today she did not hit her head nor lose consicounss. She has no chest pain, back pain or neck pain. She denies any headache but states that her left face does hurt. She states that she has been complaint with her medications. The history is provided by the patient. No  was used. Review of Systems   Constitutional: Negative for fever. HENT: Negative for congestion. Respiratory: Negative for cough and shortness of breath. Cardiovascular: Negative for chest pain. Gastrointestinal: Negative for abdominal pain. Endocrine: Negative for polyuria. Genitourinary: Negative for dysuria. Musculoskeletal: Positive for back pain. Skin: Positive for wound. Allergic/Immunologic: Negative for immunocompromised state. Neurological: Negative for headaches. Hematological: Bruises/bleeds easily. Psychiatric/Behavioral: Negative for confusion. Physical Exam   Constitutional: She is oriented to person, place, and time. Appears Stated age, in NAD   HENT:   Mouth/Throat: Oropharynx is clear and moist.   Area of eccymosis and tenderness to left cheek   Eyes: Pupils are equal, round, and reactive to light. EOM are normal.   Neck: Normal range of motion. Neck supple.    No midline tenderness   Cardiovascular: Normal rate and regular rhythm. Pulmonary/Chest: Effort normal and breath sounds normal. No stridor. No respiratory distress. She has no wheezes. Abdominal: Soft. She exhibits no mass. There is no tenderness. There is no guarding. Musculoskeletal:   Left leg rotated and shortened. Palpable DP pulses. Tenderness along anterior left hip. Tenderness along left lateral shoulder with superficial abrasion. No midline c-spine, t-spine or l spine tenderness or stepoffs   Neurological: She is alert and oriented to person, place, and time. No cranial nerve deficit. Skin: Skin is warm and dry. Psychiatric: She has a normal mood and affect. Her behavior is normal.   Nursing note and vitals reviewed. Procedures    MDM  Number of Diagnoses or Management Options  Acute cystitis without hematuria:   Closed nondisplaced intertrochanteric fracture of left femur, initial encounter [facility]):   Fracture of humeral head, closed, left, initial encounter:   Diagnosis management comments: This is a 79-year-old female with a past medical history of atrial fibrillation who is on anticoagulation who presented to the ED for 2nd fall the past 3 days. The patient initially fell 3 days ago was complaining of left shoulder pain and had another fall today and was complaining of left hip pain and could not get up to her feet. The patient did have shortening and rotation of her left lower extremity was neurovascularly intact. Additionally the patient was tachycardic and atrial fibrillation with RVR a heart rate of 114. Patient had significant improvement in her heart rate after she was given IV fluids and given pain control and no further rate control was needed. The patient was also found to have a UTI and was treated with Rocephin. The patient was found to have a humeral head fracture and a left intertrocheteric fracture of her left hip. The patient was placed in sling for her arm. Dr. Hiro Leon was agreeable with the plan. --------------------------------------------- PAST HISTORY ---------------------------------------------  Past Medical History:  has a past medical history of Acute blood loss anemia, Acute upper GI bleed, Atrial fibrillation ([facility] Breast cancer ([facility] CAD (coronary artery disease), Chronic deep vein thrombosis (DVT) of tibial vein of right lower extremity (HCC), Chronic diastolic CHF (congestive heart failure) ([facility] Chronic pulmonary embolism ([facility] Colon cancer ([facility] Full dentures, History of blood transfusion, History of fracture of right shoulder, History of peptic ulcer disease, History of pulmonary embolism, Hx of blood clots, Hypertension, Osteoarthritis of right shoulder, Psoriasis, Pulmonary hypertension ([facility] Seasonal allergies, Skin cancer, Thalassemia trait, and Wears glasses. Past Surgical History:  has a past surgical history that includes Tonsillectomy; Breast lumpectomy (Right); Breast lumpectomy (Left); Total shoulder arthroplasty (Right, 10/23/2017); Upper gastrointestinal endoscopy (11/09/2017); Vena Cava Filter Placement (11/09/2017); Upper gastrointestinal endoscopy (12/28/2017); Esophageal varice ligation (12/28/2017); Colon surgery (01/24/2018); Colonoscopy (12/28/2017); Colonoscopy (12/13/2018); and Colonoscopy (N/A, 12/13/2018). Social History:  reports that she quit smoking about 20 years ago. Her smoking use included cigarettes. She quit after 20.00 years of use. She has never used smokeless tobacco. She reports that she does not drink alcohol or use drugs. Family History: family history includes Cancer in her brother; Heart Attack in her brother and mother; Other in her brother, sister, and sister; Stroke in her brother and father. The patients home medications have been reviewed. Allergies: Patient has no known allergies.     -------------------------------------------------- RESULTS -------------------------------------------------    LABS:  Results for orders placed or performed during the hospital encounter of 04/13/19   Comprehensive Metabolic Panel   Result Value Ref Range    Sodium 140 132 - 146 mmol/L    Potassium 3.9 3.5 - 5.0 mmol/L    Chloride 104 98 - 107 mmol/L    CO2 23 22 - 29 mmol/L    Anion Gap 13 7 - 16 mmol/L    Glucose 89 74 - 99 mg/dL    BUN 14 8 - 23 mg/dL    CREATININE 0.8 0.5 - 1.0 mg/dL    GFR Non-African American >60 >=60 mL/min/1.73    GFR African American >60     Calcium 7.3 (L) 8.6 - 10.2 mg/dL    Total Protein 4.4 (L) 6.4 - 8.3 g/dL    Alb 2.2 (L) 3.5 - 5.2 g/dL    Total Bilirubin 0.6 0.0 - 1.2 mg/dL    Alkaline Phosphatase 337 (H) 35 - 104 U/L    ALT 41 (H) 0 - 32 U/L    AST 82 (H) 0 - 31 U/L   CBC Auto Differential   Result Value Ref Range    WBC 8.0 4.5 - 11.5 E9/L    RBC 3.98 3.50 - 5.50 E12/L    Hemoglobin 9.7 (L) 11.5 - 15.5 g/dL    Hematocrit 29.9 (L) 34.0 - 48.0 %    MCV 75.1 (L) 80.0 - 99.9 fL    MCH 24.4 (L) 26.0 - 35.0 pg    MCHC 32.4 32.0 - 34.5 %    RDW 18.6 (H) 11.5 - 15.0 fL    Platelets 149 352 - 187 E9/L    MPV 12.2 (H) 7.0 - 12.0 fL    Neutrophils % 76.9 43.0 - 80.0 %    Immature Granulocytes % 1.2 0.0 - 5.0 %    Lymphocytes % 16.5 (L) 20.0 - 42.0 %    Monocytes % 5.1 2.0 - 12.0 %    Eosinophils % 0.1 0.0 - 6.0 %    Basophils % 0.2 0.0 - 2.0 %    Neutrophils # 6.17 1.80 - 7.30 E9/L    Immature Granulocytes # 0.10 E9/L    Lymphocytes # 1.33 (L) 1.50 - 4.00 E9/L    Monocytes # 0.41 0.10 - 0.95 E9/L    Eosinophils # 0.01 (L) 0.05 - 0.50 E9/L    Basophils # 0.02 0.00 - 0.20 E9/L   Troponin   Result Value Ref Range    Troponin <0.01 0.00 - 0.03 ng/mL   Urinalysis   Result Value Ref Range    Color, UA Yellow Straw/Yellow    Clarity, UA Clear Clear    Glucose, Ur Negative Negative mg/dL    Bilirubin Urine Negative Negative    Ketones, Urine Negative Negative mg/dL    Specific Gravity, UA 1.015 1.005 - 1.030    Blood, Urine Negative Negative    pH, UA 5.5 5.0 - 9.0    Protein, UA Negative Negative mg/dL    Urobilinogen, Urine 0.2 <2.0 E. U./dL    Nitrite, Urine Negative Negative    Leukocyte Esterase, Urine SMALL (A) Negative   Protime-INR   Result Value Ref Range    Protime 14.9 (H) 9.3 - 12.4 sec    INR 1.3    Microscopic Urinalysis   Result Value Ref Range    Casts RARE /LPF    WBC, UA >20 0 - 5 /HPF    RBC, UA 1-3 0 - 2 /HPF    Bacteria, UA MANY (A) /HPF   EKG 12 Lead   Result Value Ref Range    Ventricular Rate 119 BPM    Atrial Rate 107 BPM    QRS Duration 60 ms    Q-T Interval 284 ms    QTc Calculation (Bazett) 399 ms    R Axis 18 degrees    T Axis -164 degrees       RADIOLOGY:  XR SHOULDER LEFT (MIN 2 VIEWS)   Final Result      Impacted fracture involving left humeral neck. XR HIP 2-3 VW W PELVIS LEFT   Final Result      Intertrochanteric left hip fracture. XR CHEST PORTABLE   Final Result      1. Limited examination due to rotation. 2. Opacities overlying left costophrenic sulcus which may be related   to overlying soft tissue summation. Right lower lobe pneumonia,   atelectasis, or effusion is possible. Repeat chest radiograph may be   warranted for further evaluation. CT Head WO Contrast   Final Result      1. No evidence of acute intracranial hemorrhage or edema. 2. Chronic areas of stroke are seen involving right and left   cerebellar hemispheres. CT Facial Bones WO Contrast   Final Result      1. Mildly displaced fracture involving right nasal bone of uncertain   chronicity. 2. Globes and orbits are intact. CT Cervical Spine WO Contrast   Final Result   1. Degenerative changes are seen predominant in the facet joints at   C3-4 more on the left than on the right. 2. Central posterior disc extrusion seen at C3-4 and C4-5.      3. Findings compatible with diffuse idiopathic skeletal hyperostosis   as commented.       4. No acute fractures seen in the cervical spine.                    ------------------------- NURSING NOTES AND VITALS REVIEWED ---------------------------  Date / Time Roomed:  4/13/2019  9:07 PM  ED Bed Assignment:  [id number]    The nursing notes within the ED encounter and vital signs as below have been reviewed. Patient Vitals for the past 24 hrs:   BP Temp Temp src Pulse Resp SpO2 Height Weight   04/14/19 0042 115/87 98 °F (36.7 °C) Oral 130 16 99 % -- 128 lb 1.6 oz (58.1 kg)   04/13/19 2332 108/71 98.5 °F (36.9 °C) Oral 98 16 96 % -- --   04/13/19 2228 -- 98.5 °F (36.9 °C) Oral 108 16 97 % -- --   04/13/19 2112 107/74 98.4 °F (36.9 °C) Oral 94 14 99 % 5' 1\" (1.549 m) 125 lb (56.7 kg)       Oxygen Saturation Interpretation: Normal    ------------------------------------------ PROGRESS NOTES ------------------------------------------  Re-evaluation(s):  Time: 2211  Patients symptoms are improving  Repeat physical examination is improved    Counseling:  I have spoken with the patient and discussed todays results, in addition to providing specific details for the plan of care and counseling regarding the diagnosis and prognosis. Their questions are answered at this time and they are agreeable with the plan of admission.    --------------------------------- ADDITIONAL PROVIDER NOTES ---------------------------------  Consultations:  Spoke with Dr. Pricilla Alexander  Discussed case. They will admit the patient. This patient's ED course included: a personal history and physicial examination, re-evaluation prior to disposition, multiple bedside re-evaluations, IV medications, cardiac monitoring, continuous pulse oximetry and complex medical decision making and emergency management    This patient has remained hemodynamically stable during their ED course. Diagnosis:  1. Closed nondisplaced intertrochanteric fracture of left femur, initial encounter ([facility]    2. Fracture of humeral head, closed, left, initial encounter    3. Acute cystitis without hematuria        Disposition:  Patient's disposition: Admit to med/surg floor  Patient's condition is stable.         EKG Interpretation. EKG: This EKG is signed and interpreted by me.     Rate: 119  Rhythm: Atrial fibrillation  Interpretation: atrial fibrillation (chronic)  Comparison: changes compared to previous EKG          [facility]   Resident  04/14/19 3092

## 2019-04-14 NOTE — PROGRESS NOTES
Call to Dr Hiro Salazar to get order for pt to eat, he was unaware that pt had been transferred due to atrial fibrillation, he was informed pt is on cardizem gtt and dr Bianca Zayas is going to see her this afternoon, order obtained for pt to eat today then npo after midnight tonight

## 2019-04-15 NOTE — PROGRESS NOTES
Internal Medicine Progress Note  Patient's name: Coleen Arteaga  : 1940  Admission date: 2019  Date of service: 4/15/2019   Primary care physician: [address] ([address])    [id number] [place]. Lowell Wooten states that that she feels well as long as she does not move. She does state that she has pain in her left hip and left shoulder upon movement or palpation. Patient states that she feels her normal self otherwise and does not have any acute complaints at this time. On discussion with nursing:   Informed that patient has been cleared by cardio for surgery and that she is scheduled to have orthopedic surgery for her left hip fracture at 1900 hrs. today. .    No family present during my examination. Review of Systems  There are no new complaints of chest pain, shortness of breath, abdominal pain, nausea, vomiting, diarrhea, constipation.     Hospital Medications  Current Facility-Administered Medications   Medication Dose Route Frequency Provider Last Rate Last Dose    morphine (PF) injection 2 mg  2 mg Intravenous Q4H PRN Woody Bae, DO   2 mg at 19 0113    sodium chloride flush 0.9 % injection 10 mL  10 mL Intravenous 2 times per day Woody Ferrerino, DO   10 mL at 19    sodium chloride flush 0.9 % injection 10 mL  10 mL Intravenous PRN Woody Bae, DO   10 mL at 19 1334    magnesium hydroxide (MILK OF MAGNESIA) 400 MG/5ML suspension 30 mL  30 mL Oral Daily PRN Woody Ferrerino, DO        ondansetron (ZOFRAN) injection 4 mg  4 mg Intravenous Q6H PRN Woody Ferrerino, DO        acetaminophen (TYLENOL) tablet 650 mg  650 mg Oral Q4H PRN Woody Ferrerino, DO        montelukast (SINGULAIR) tablet 10 mg  10 mg Oral Nightly Woody Ferrerino, DO   10 mg at 19    vitamin D tablet 2,000 Units  2,000 Units Oral Nightly Woody Ferrerino, DO   2,000 Units at 19    pantoprazole (PROTONIX) tablet 40 mg  40 mg Oral Daily Woody Bae, DO   Stopped at 19 conjunctivae/corneas clear, sclera non icteric  Skin: color/texture/turgor normal, no rashes or lesions  Lungs: CTAB, no retractions/use of accessory muscles, no vocal fremitus, no rhonchi, no crackle, no rales  Heart: regular rate, regular rhythm, no murmur  Abdomen: soft, NT; bowel sounds normal; no masses,  no organomegaly  Extremities: Externally rotated left hip, left arm is in a sling secondary to humeral fracture, there is some ecchymosis overlying patient's left shoulder from her fall. 2+ bilateral pitting edema present in lower extremities. Neurologic: cranial nerves 2-12 grossly intact, no slurred speech. Most Recent Labs  Lab Results   Component Value Date    WBC 5.8 04/14/2019    HGB 8.6 (L) 04/14/2019    HCT 26.3 (L) 04/14/2019     04/14/2019     04/14/2019    K 3.7 04/14/2019     04/14/2019    CREATININE 0.7 04/14/2019    BUN 15 04/14/2019    CO2 25 04/14/2019    GLUCOSE 82 04/14/2019    ALT 35 (H) 04/14/2019    AST 49 (H) 04/14/2019    INR 1.3 04/13/2019    TSH 0.389 11/07/2017       XR SHOULDER LEFT (MIN 2 VIEWS)   Final Result      Impacted fracture involving left humeral neck. XR HIP 2-3 VW W PELVIS LEFT   Final Result      Intertrochanteric left hip fracture. XR CHEST PORTABLE   Final Result      1. Limited examination due to rotation. 2. Opacities overlying left costophrenic sulcus which may be related   to overlying soft tissue summation. Right lower lobe pneumonia,   atelectasis, or effusion is possible. Repeat chest radiograph may be   warranted for further evaluation. CT Head WO Contrast   Final Result      1. No evidence of acute intracranial hemorrhage or edema. 2. Chronic areas of stroke are seen involving right and left   cerebellar hemispheres. CT Facial Bones WO Contrast   Final Result      1. Mildly displaced fracture involving right nasal bone of uncertain   chronicity. 2. Globes and orbits are intact.       CT Cervical Spine WO Contrast   Final Result   1. Degenerative changes are seen predominant in the facet joints at   C3-4 more on the left than on the right. 2. Central posterior disc extrusion seen at C3-4 and C4-5.      3. Findings compatible with diffuse idiopathic skeletal hyperostosis   as commented. 4. No acute fractures seen in the cervical spine. BLOOD CX #1  No results for input(s): BC in the last 72 hours. BLOOD CX #2  No results for input(s): [place] in the last 72 hours. TIP CULTURE  No results for input(s): CXCATHTIP in the last 72 hours. CULTURE, RESPIRATORY   No results for input(s): CULTRESP in the last 72 hours. RESPIRATORY SMEAR  No results for input(s): RESPSMEAR in the last 72 hours. BODY FLUID CULTURE  No results for input(s): BFCS in the last 72 hours. WOUND ABSCESS  No results for input(s): WNDABS in the last 72 hours. Anaerobic cx  No results for input(s): LABANAE in the last 72 hours. CULTURE SURGICAL  No results for input(s): CXSURG in the last 72 hours. URINE CULTURE  No results for input(s): LABURIN in the last 72 hours. No results for input(s): ORG in the last 72 hours. MISC. SENDOUT  No results for input(s): MREF in the last 72 hours. STREP PNEUMONIA AG URINE  No results for input(s): STREPNEUMAGU in the last 72 hours. LEGIONELLA AG URINE  No results for input(s): LEGUR in the last 72 hours. No results for input(s): [address] in the last 72 hours.       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

## 2019-04-15 NOTE — PROGRESS NOTES
Please be aware of the possible interaction of QT-interval prolongation with Amiodarone and Zofran. Monitor the patient closely. Thank Yolanda Wilkinson Pharm. D 4/15/2019 8:20 AM

## 2019-04-15 NOTE — PROGRESS NOTES
P Quality Flow/Interdisciplinary Rounds Progress Note        Quality Flow Rounds held on April 15, 2019    Disciplines Attending:  Bedside Nurse, ,  and Nursing Unit [id number] was admitted on 4/13/2019  9:07 PM    Anticipated Discharge Date:       Disposition:    Piter Score:  Piter Scale Score: 13    Readmission Risk              Risk of Unplanned Readmission:        20           Discussed patient goal for the day, patient clinical progression, and barriers to discharge.   The following Goal(s) of the Day/Commitment(s) have been identified:  surgery      Melany Rangel  April 15, 2019

## 2019-04-15 NOTE — CONSULTS
Cardiology consult  Dr. Santhosh Mccracken      Reason for Consult: Atrial fibrillation with RVR, preoperative cardiac evaluation  Requesting Physician: Dr.Louis RIO Bae  CHIEF COMPLAINT: I fell and broke my arm  History Obtained From: patient, electronic medical record  HISTORY OF PRESENT ILLNESS:   Patient is 67years old female with history of atrial fibrillation, hypertension, hyperlipidemia, was admitted to the hospital with a broken hip and down, patient scheduled for ORIF of her hip, patient was found to have A. fib with RVR, cardiology was consulted for evaluation of preoperative cardiac risk assessment. Stated she fell at home, denies loss of consciousness, denies any chest pain, shortness of breath is at baseline, positive pedal edema, visual palpitations, no syncope, no presyncopal episodes. Past Medical History:   Diagnosis Date    Acute blood loss anemia 11/09/2017    D/t acute UGI bleed from duodenal ulcer    Acute upper GI bleed 11/09/2017    Soon after starting Eliquis [address]   and 5/10/2018    Atrial fibrillation ([address] 11/07/2017    sees Dr. Elzie Krabbe [facility])     bilateral breast cancer    CAD (coronary artery disease)     12/7/2018  denies any cardiac symptoms/issues.     Chronic deep vein thrombosis (DVT) of tibial vein of right lower extremity (HCC) 11/7/2017    Chronic diastolic CHF (congestive heart failure) (HCC)     Chronic pulmonary embolism ([address] 11/7/2017    Colon cancer ([address]     Full dentures     History of blood transfusion 11/2017    [place] ed    History of fracture of right shoulder 12/2013    History of peptic ulcer disease 11/09/2017    History of pulmonary embolism 11/07/2017    Hx of blood clots     right leg, and right arm  - for US of Right arm 12-18-17    Hypertension     Osteoarthritis of right shoulder 10/2017    Psoriasis     10/2017 scalp reddened, area on left abdomen dry with small scab    Pulmonary hypertension ([address] 12/04/2017    incontinence and hesitancy  HEMATOLOGIC/LYMPHATIC:  negative for easy bruising, bleeding, lymphadenopathy and petechiae  ALLERGIC/IMMUNOLOGIC:  negative for urticaria, hay fever and angioedema  ENDOCRINE:  negative for heat intolerance,symptoms including neither polyuria nor polydipsia nor blurred vision  MUSCULOSKELETAL:  negative for  myalgias, arthralgias, joint swelling, stiff joints   NEUROLOGICAL:  Generalized weakness       PHYSICAL EXAM:   CONSTITUTIONAL:  awake, alert, cooperative, no apparent distress, and appears stated age  EYES:  lids and lashes normal and pupils equal, round and reactive to light, anicteric sclerae  HEAD:  normocepalic, without obvious abnormality, atraumatic, pink, moist mucous membranes. NECK:  Supple, symmetrical, trachea midline, no adenopathy, thyroid symmetric, not enlarged and no tenderness, skin normal  HEMATOLOGIC/LYMPHATICS:  no cervical lymphadenopathy and no supraclavicular lymphadenopathy  LUNGS:  No increased work of breathing, good air exchange, clear to auscultation bilaterally, no crackles or wheezing  CARDIOVASCULAR:  Normal apical impulse, irregularly irregular, tachycardic, normal S1 and S2, no S3, 3/6 systolic murmur at the apex, no JVD, no carotid bruit, no pedal edema, good carotid upstroke bilaterally. ABDOMEN:  Soft, nontender, no masses, no hepatomegaly or splenomegaly, BS+  CHEST: nontender to palpation, expands symmetrically  MUSCULOSKELETAL:  No clubbing no cyanosis. there is no redness, warmth, or swelling of the joints  NEUROLOGIC:  Alert, awake  SKIN:  no bruising or bleeding, normal skin color, texture, turgor and no redness, warmth, or swelling      BP 95/67   Pulse 90   Temp 98.8 °F (37.1 °C) (Oral)   Resp 18   Ht 5' 1\" (1.549 m)   Wt 128 lb 1.6 oz (58.1 kg)   SpO2 96%   BMI 24.20 kg/m²     DATA:   I personally reviewed the admission EKG with the following interpretation: Atrial fibrillation with RVR, diffuse nonspecific T-wave changes    ECHO: 11/7/2017,Summary   Ejection fraction biplane = 61%.   The left atrium is moderately dilated.   Atrial fibrillation   Elevated L atrial pressure   Mild RV hypokinesis   Mild mitral regurgitation   Pulmonary hypertension is moderate .   No pericardial effusion. Stress Test: Not performed to date  Angiography: Not performed to date  Cardiology Labs:   BMP:    Lab Results   Component Value Date     04/14/2019    K 3.7 04/14/2019     04/14/2019    CO2 25 04/14/2019    BUN 15 04/14/2019     CMP:    Lab Results   Component Value Date     04/14/2019    K 3.7 04/14/2019     04/14/2019    CO2 25 04/14/2019    BUN 15 04/14/2019    PROT 3.8 04/14/2019     CBC:    Lab Results   Component Value Date    WBC 5.8 04/14/2019    RBC 3.49 04/14/2019    HGB 8.6 04/14/2019    HCT 26.3 04/14/2019    MCV 75.4 04/14/2019    RDW 18.5 04/14/2019     04/14/2019     PT/INR:  No results found for: PTINR  PT/INR Warfarin:  No components found for: PTPATWAR, PTINRWAR  PTT:    Lab Results   Component Value Date    APTT 28.8 01/18/2019     PTT Heparin:  No components found for: APTTHEP  Magnesium:    Lab Results   Component Value Date    MG 1.4 05/11/2018     TSH:    Lab Results   Component Value Date    TSH 0.389 11/07/2017     TROPONIN:  No components found for: TROP  BNP:  No results found for: BNP  FASTING LIPID PANEL:  No results found for: CHOL, HDL, TRIG  XR SHOULDER LEFT (MIN 2 VIEWS)   Final Result      Impacted fracture involving left humeral neck. XR HIP 2-3 VW W PELVIS LEFT   Final Result      Intertrochanteric left hip fracture. XR CHEST PORTABLE   Final Result      1. Limited examination due to rotation. 2. Opacities overlying left costophrenic sulcus which may be related   to overlying soft tissue summation. Right lower lobe pneumonia,   atelectasis, or effusion is possible. Repeat chest radiograph may be   warranted for further evaluation.       CT Head WO Contrast   Final Result      1. No evidence of acute intracranial hemorrhage or edema. 2. Chronic areas of stroke are seen involving right and left   cerebellar hemispheres. CT Facial Bones WO Contrast   Final Result      1. Mildly displaced fracture involving right nasal bone of uncertain   chronicity. 2. Globes and orbits are intact. CT Cervical Spine WO Contrast   Final Result   1. Degenerative changes are seen predominant in the facet joints at   C3-4 more on the left than on the right. 2. Central posterior disc extrusion seen at C3-4 and C4-5.      3. Findings compatible with diffuse idiopathic skeletal hyperostosis   as commented. 4. No acute fractures seen in the cervical spine. I have personally reviewed the laboratory, cardiac diagnostic and radiographic testing as outlined above:    A 67years old female with history of atrial fibrillation, hypertension, is scheduled to have ORIF of her left hip:  IMPRESSION:  1. Atrial fibrillation with RVR: Hypotensive on IV Cardizem, will discontinue   beta blocker, Will start IV amiodarone, will give another dose of IV digoxin, will resume home medications postoperatively. 2. Preoperative cardiac assessment: Patient is at increased but acceptable risk for the planned orthopedic procedure (ORIF of the left hip}, patient may proceed with the planned procedure without any further cardiac testing, since any cardiac testing is going to unnecessarily delay the needed surgery without significantly affecting the outcome. 3. Acute exacerbation of congestive heart failure: Diastolic, decompensated, will hold IV fluids, will continue diuresis. 4. Hypotension: Iatrogenic?, Will discontinue calcium channel blockers and beta blockers  5. Mild mitral valve regurgitation  6. Hyperlipidemia  7. History of DVTs    RECOMMENDATIONS:   1. Patient is at increased but acceptable risk for the planned orthopedic procedure (ORIF of the left hip}, patient may proceed with the planned procedure without any further cardiac testing, since any cardiac testing is going to unnecessarily delay the needed surgery without significantly affecting the outcome. 2. Discontinue IV Cardizem  3. Discontinue metoprolol  4. IV amiodarone  5. Will avoid aggressive IV hydration perioperatively   6. We will resume anticoagulation postoperatively when okay with surgery    I have reviewed my findings and recommendations with patient, no family at bedside  Discussed with the nursing staff    Thank you for the consult  Electronically signed by Dixie Raman MD on 4/14/2019 at 9:09 PM  NOTE: This report was transcribed using voice recognition software.  Every effort was made to ensure accuracy; however, inadvertent computerized transcription errors may be present

## 2019-04-15 NOTE — PLAN OF CARE
Problem: Pain:  Goal: Control of acute pain  Description  Control of acute pain  Outcome: Met This Shift     Problem: Risk for Impaired Skin Integrity  Goal: Tissue integrity - skin and mucous membranes  Description  Structural intactness and normal physiological function of skin and  mucous membranes.   Outcome: Met This Shift     Problem: Falls - Risk of:  Goal: Will remain free from falls  Description  Will remain free from falls  Outcome: Met This Shift

## 2019-04-15 NOTE — CARE COORDINATION
Social work / discharge planning    4/15/2019 7:41 AM     Patient is active with [facility] for surgery today for left hip fracture,  Sw to follow     Electronically signed by YADIEL Dorsey on 4/15/2019 at 7:42 AM

## 2019-04-15 NOTE — PROGRESS NOTES
Orthopaedic Surgery Progress Note  Lanie Barrett MD    Date of Encounter: 4/15/19    Time of Encounter: 0630    Subjective: Patient resting comfortably.     Assessment: 66 yo female with left hip fx    Plan:  NPO after 8 am  Plan for surgery today

## 2019-04-16 NOTE — PROGRESS NOTES
negative for- generalized myalgias, arthralgias, or claudication Positive for-musculoskeletal soreness from areas of injury. .  Neurological ROS: no TIA or stroke symptoms otherwise no significant change in symptoms or problems since yesterday as documented in previous progress notes. SCHEDULED MEDICATIONS:   amiodarone  200 mg Oral BID    sodium chloride flush  10 mL Intravenous 2 times per day    ceFAZolin (ANCEF) IVPB  2 g Intravenous Q8H    enoxaparin  40 mg Subcutaneous Daily    sodium chloride flush  10 mL Intravenous 2 times per day    montelukast  10 mg Oral Nightly    vitamin D  2,000 Units Oral Nightly    pantoprazole  40 mg Oral Daily    furosemide  40 mg Oral Daily    potassium chloride  20 mEq Oral BID    polycarbophil  625 mg Oral Daily    cholestyramine  1 packet Oral Daily       VITAL SIGNS:                                                                                                                          /72   Pulse 106   Temp 96.7 °F (35.9 °C)   Resp 16   Ht 5' 1\" (1.549 m)   Wt 149 lb 6.4 oz (67.8 kg)   SpO2 95%   BMI 28.23 kg/m²   Patient Vitals for the past 96 hrs (Last 3 readings):   Weight   04/15/19 0440 135 lb 8 oz (61.5 kg)   04/14/19 0042 128 lb 1.6 oz (58.1 kg)     OBJECTIVE:    HEENT: PERRL, EOM  Intact; sclera non-icteric, conjunctiva pink. Carotids are brisk in upstroke with normal contour. No carotid bruits. Normal jugular venous pulsation at 45°. No palpable cervical nor supraclavicular nodes. Thyroid not palpable. Trachea midline. Chest: Even excursion  Lungs: CTA B, no expiratory wheezes or rhonchi, no decreased tactile fremitus without inspiratory rales. Heart: Regular  rhythm; S1 > S2, no gallop or murmur. No clicks, rub, palpable thrills   or heaves. PMI nondisplaced, 5th intercostal space MCL. Abdomen: Soft, nontender, nondistended,  moderately protuberant, no masses or organomegaly. Bowel sounds active.   Extremities: Without clubbing, cyanosis or edema. Pulses present 3+ upper extermities bilaterally; present 1+ DP and present 1+ PT bilaterally. Data:   Scheduled Meds: Reviewed  Continuous Infusions:     Intake/Output Summary (Last 24 hours) at 4/15/2019 0943  Last data filed at 2019 8233  Gross per 24 hour   Intake 400 ml   Output 150ml   Net 250 ml     CBC:     197 19  1000 04/15/19  0900   WBC 8.0 5.8 6.1   HGB 9.7* 8.6* 13.3   HCT 29.9* 26.3* 40.9    179 161     BMP:  Recent Labs     19  1000 04/15/19  0900 04/15/19  1020    140 137  --    K 3.9 3.7 6.3* 3.8    105 103  --    CO2 23 25 23  --    BUN 14 15 14  --    CREATININE 0.8 0.7 0.6  --    LABGLOM >60 >60 >60  --      ABGs:   Lab Results   Component Value Date    PH 7.479 2018    PO2 66.9 2018    PCO2 39.7 2018     INR:     19   INR 1.3     PRO-BNP:   Lab Results   Component Value Date    PROBNP 3,196 (H) 2017    PROBNP 2,629 (H) 2017      TSH:   Lab Results   Component Value Date    TSH 2.170 04/15/2019      Cardiac Injury Profile:   Recent Labs     19   TROPONINI <0.01      Lipid Profile: No results found for: TRIG, HDL, LDLCALC, CHOL   Hemoglobin A1C: No components found for: HGBA1C     RAD:   Ct Head Wo Contrast    Result Date: 2019  Patient MRN:  [id number] : 1940 Age: 67 years Gender: Female Order Date:  2019 9:30 PM EXAM: CT HEAD WO CONTRAST COMPARISON: None INDICATION:  Fall yesterday  TECHNIQUE: Axial unenhanced CT scanning was performed through the head without the use of intravenous contrast. Low-dose CT acquisition technique included one of the following options; 1. Automated exposure control, 2. Adjustment of mA and/or kV according to the patient's size or 3. Use of iterative reconstruction. FINDINGS: There is area of chronic stroke involving right and left cerebellar hemispheres. No evidence of acute intracranial hemorrhage or edema.  No abnormal extra-axial fluid collections. No hydrocephalus. Areas of hypoattenuation are present in periventricular white matter suggestive of areas of chronic microvascular ischemic change. No abnormal extra-axial fluid collections. There is no evidence of depressed calvarial fracture. 1. No evidence of acute intracranial hemorrhage or edema. 2. Chronic areas of stroke are seen involving right and left cerebellar hemispheres. Ct Facial Bones Wo Contrast    Result Date: 2019  Patient MRN:  [id number] : 1940 Age: 67 years Gender: Female Order Date:  2019 9:30 PM EXAM: CT FACIAL BONES WO CONTRAST COMPARISON: None INDICATION:  Fall, left cheek pain and bruising  TECHNIQUE:  Low-dose CT acquisition technique included one of the following options; 1. Automated exposure control, 2. Adjustment of mA and/or kV according to the patient's size or 3. Use of iterative reconstruction. FINDINGS: There is a mildly displaced fracture involving right nasal bone. There is no overlying soft tissue edema. Both orbits are intact. Globes are intact. 1. Mildly displaced fracture involving right nasal bone of uncertain chronicity. 2. Globes and orbits are intact. Ct Cervical Spine Wo Contrast    Result Date: 2019  Patient MRN:  [id number] : 1940 Age: 67 years Gender: Female Order Date:  2019 9:30 PM TECHNIQUE/NUMBER OF IMAGES/COMPARISON/CLINICAL HISTORY: . CT scan cervical spine Sequential axial images were obtained sagittal coronal and oblique reconstructions 506 5 images. A 15-year-old female patient with history trauma. No prior studies available for comparison. FINDINGS: There are findings compatible with diffuse idiopathic hyperostosis with the bridging/near bridging prominent anterior osteophytes with a relative well maintained disc space through the mid lower cervical spine and upper thoracic spine and visualized this study. Vertebral bodies maintain preserved height.  Degenerative changes Gender: Female Order Date:  2019 9:30 PM EXAM: XR CHEST PORTABLE COMPARISON: 2018 INDICATION:  FAll, left chest pain FAll, left chest pain FINDINGS: Limited examination due to patient rotation. Opacities overlying left costophrenic sulcus. The heart is normal in size. No pneumothorax. Right shoulder arthroplasty present. 1. Limited examination due to rotation. 2. Opacities overlying left costophrenic sulcus which may be related to overlying soft tissue summation. Right lower lobe pneumonia, atelectasis, or effusion is possible. Repeat chest radiograph may be warranted for further evaluation. Fluoro For Surgical Procedures    Result Date: 4/15/2019  Patient MRN: [id number] : 1940 Age:  67 years Gender: Female Order Date: 4/15/2019 7:15 AM Exam: FLUORO FOR SURGICAL PROCEDURES Number of Images: 7 views Indication:  R52 Pain left hip ORIF Comparison: None. Findings: Patient undergoing left hip nailing for left hip fracture. Demographic pages submitted which demonstrates a 2.4 of fluoroscopy. 6 images are submitted. Images demonstrate evidence for undergoing nailing for left hip intertrochanteric fracture with orthopedic screw and proximal intramedullary kali in place. The left hip appears to be anatomical position. The detail of the report with intraoperative report. Xr Hip 2-3 Vw W Pelvis Left    Result Date: 2019  Patient MRN:  [id number] : 1940 Age: 67 years Gender: Female Order Date:  2019 9:30 PM EXAM: XR HIP 2-3 VW W PELVIS LEFT COMPARISON: Correlation made with prior from 2018 and 2018 INDICATION:  pain pain VIEWS: 2 FINDINGS: There is a fracture involving the intertrochanteric region of the left hip with mild increased varus angulation. There is displacement of lesser trochanteric fracture fragment below left femoral neck. Femoral head maintains articulation with the acetabulum. Intertrochanteric left hip fracture.       EKG: See Report  Echo: 11/7/2017  Summary   Ejection fraction biplane = 61%.   The left atrium is moderately dilated.   Atrial fibrillation   Elevated L atrial pressure   Mild RV hypokinesis   Mild mitral regurgitation   Pulmonary hypertension is moderate .   No pericardial effusion. IMPRESSIONS:  Principal Problem:    Closed intertrochanteric fracture of left femur, initial encounter ([address]  Active Problems:    Chronic pulmonary embolism (HCC)    Persistent atrial fibrillation (HCC)    Fx humeral neck, left, closed, initial encounter    History of pulmonary embolism    Chronic deep vein thrombosis (DVT) of tibial vein of right lower extremity (HCC)    S/P IVC filter    S/P reverse total shoulder arthroplasty, right    Essential hypertension    Psoriasis    Anemia due to blood loss    Thalassemia trait    B12 deficiency    H/o Duodenal ulcer    Pulmonary hypertension (HCC)    Primary adenocarcinoma of ascending colon (HCC)    Malignant neoplasm of hepatic flexure (HCC)    Carcinoid tumor    Chronic anticoagulation    Hypertension    History of peptic ulcer disease    Chronic diastolic CHF (congestive heart failure) (HCC)    Chronic deep vein thrombosis (DVT) of femoral vein of left lower extremity (HCC)    Colon cancer (HCC)    CAD (coronary artery disease)    Atrial fibrillation ([address]  Resolved Problems:    * No resolved hospital problems. *      RECOMMENDATIONS:  Continue conservative medical management as well as DVT and systemic embolization prophylaxis. Both patient and  noted that she did not lose consciousness either prior to or following her fall. I have spent more than 25 minutes face to face with Brielle Go and reviewing notes and laboratory data, with greater than 50% of this time instructing and counseling the patient and her  face to face regarding my findings and recommendations and I have answered all questions as posed to me by Ms. Silvia Bower and her .     Maria Guadalupe Hernández, DO FACP,FACC,FSCAI      NOTE:  This report was transcribed using voice recognition software.   Every effort was made to ensure accuracy; however, inadvertent computerized transcription errors may be present

## 2019-04-16 NOTE — OP NOTE
[address] [address]                                OPERATIVE REPORT    PATIENT NAME: CATIA GOINS                      :        1940  MED REC NO:   [id number]                            ROOM:       [id number]  ACCOUNT NO:   [de-identified]                           ADMIT DATE: 2019  PROVIDER:     Elaine Silver    DATE OF PROCEDURE:  04/15/2019    PREOPERATIVE DIAGNOSIS:  Left intertrochanteric hip fracture, displaced. POSTOPERATIVE DIAGNOSIS:  Left intertrochanteric hip fracture,  displaced. PROCEDURE PERFORMED:  Left intertrochanteric hip fracture intramedullary  nailing. COMPLICATIONS:  None. ANTIBIOTICS:  See record. ESTIMATED BLOOD LOSS:  100 mL. ANESTHESIA:  General plus local.    EQUIPMENT USED:  Synthes short troch nail, 11 mm diameter, 100 mm lag  screw and 42 mm locking screw, 125-degree angle. DESCRIPTION OF PROCEDURE:  The patient was brought to the operating room  on her cart. She underwent general anesthesia. She was then  transferred to the operating room table. All extremities were well  padded. Left leg was placed in traction. Left arm was placed across  the chest.  She was then prepped and draped in a sterile fashion using  Betadine and ChloraPrep. A timeout was performed. The skin was incised  with a knife longitudinally overlying the proximal aspect of the hip. Subcutaneous tissues were incised with the knife. Guide pin was then  chosen. This was then placed about the tip of the greater trochanter. This was then placed distally across the fracture site into the distal  aspect of the femur. Next, the _____ reamer was used. Next, the nail  was placed and this was selected over the targeting guide. This was  then BROOKLYNN FELICIANO [facility] in position. Next, the lag screw was placed. First, the  guide pin was used as this was noted to be wyndbk-wy-vthrna of the  femoral head. Reamer was used and the tap was used. Screws measured to  be 100 mm. This was then selected and placed in good position with the  lqvcpz-my-nswrva femoral head. Next, this was locked in good position. Distal locking screw was then placed. This was done using the triple  sleeve and the knife. This was done using bicortical screws. This was  a 42 mm screw placed about the distal aspect of the implant. Next, the  starting guide was then removed. Final images were taken demonstrating  good reduction of the fracture site as well as good placement of the  hardware. Wounds were irrigated. Wounds were closed with 2-0 Vicryl  and staples. She understand local anesthesia prior to her dressings. Dressings used were Xeroform, 4x4, and Island dressings. The patient  was awoken and brought to PACU in good condition. POSTOPERATIVE PLAN:  The patient will readmitted to Medicine. She will  be partial weightbearing, 50% left leg. She will begin therapy. She  will be on DVT prophylaxis and given pain medication. She will follow  up with Dr. Sandy Schaffer in two weeks.         Melina Tobar    D: 04/15/2019 21:44:58       T: 04/16/2019 6:40:47     [email or web address]  Job#: [id number]     Doc#: [id number]    CC:

## 2019-04-16 NOTE — PROGRESS NOTES
2127 admitted to pacu post op lef hip dressing dry and intact  Is moving feet well Dr Lenny Khan aware of skin integrities on neck/ chest let lower abd and left upper hip area

## 2019-04-16 NOTE — CARE COORDINATION
Social work / Discharge planning:       Social work met with patient to discuss discharge plan / transition of care. Choice list provided. Patient states she lives in [place] and wants to stay close to home. Patient prefers [address]. Referral called to liaison. Awaiting response.   Electronically signed by YADIEL Bhagat on 4/16/2019 at 3:15 PM

## 2019-04-16 NOTE — PLAN OF CARE
Problem: Pain:  Goal: Pain level will decrease  Description  Pain level will decrease  Outcome: Met This Shift  Goal: Control of acute pain  Description  Control of acute pain  Outcome: Met This Shift     Problem: Risk for Impaired Skin Integrity  Goal: Tissue integrity - skin and mucous membranes  Description  Structural intactness and normal physiological function of skin and  mucous membranes.   Outcome: Met This Shift     Problem: Falls - Risk of:  Goal: Will remain free from falls  Description  Will remain free from falls  Outcome: Met This Shift  Goal: Absence of physical injury  Description  Absence of physical injury  Outcome: Met This Shift     Problem: Cardiac:  Goal: Ability to maintain vital signs within normal range will improve  Description  Ability to maintain vital signs within normal range will improve  Outcome: Met This Shift  Goal: Cardiovascular alteration will improve  Description  Cardiovascular alteration will improve  Outcome: Met This Shift

## 2019-04-16 NOTE — ANESTHESIA POSTPROCEDURE EVALUATION
Department of Anesthesiology  Postprocedure Note    Patient: Vannesa Tillman  MRN: [id number]  YOB: 1940  Date of evaluation: 4/15/2019  Time:  10:51 PM     Procedure Summary     Date:  04/15/19 Room / Location:  [facility] OR [address] [place] [address] OR    Anesthesia Start:  2007 Anesthesia Stop:  2129    Procedures:       LEFT HIP OPEN REDUCTION INTERNAL FIXATION (Left Hip)      HIP OPEN REDUCTION INTERNAL FIXATION (canceled) Diagnosis:  (FRACTURE)    Surgeon:  Queenie Milian MD Responsible Provider:  Natalie Castaneda DO    Anesthesia Type:  general ASA Status:  4          Anesthesia Type: general    Yobany Phase I: Yobany Score: 9    Yobany Phase II:      Last vitals: Reviewed and per EMR flowsheets.        Anesthesia Post Evaluation    Patient location during evaluation: PACU  Patient participation: complete - patient participated  Level of consciousness: awake and alert  Airway patency: patent  Nausea & Vomiting: no nausea and no vomiting  Complications: no  Cardiovascular status: hemodynamically stable  Respiratory status: acceptable  Hydration status: euvolemic

## 2019-04-16 NOTE — PROGRESS NOTES
Orthopaedic Surgery Progress Note  Abril Luu MD    Date of Encounter: 4/16/19    Time of Encounter: 1200    Subjective: Improved left hip pain. Objective:  General - NAD, awake, alert  Extremity - left leg - sensation and motor grossly intact. Dressing clean and dry.     Assessment: 68 yo female POD 1 left hip IMN    Plan:  Pain control  PT  PWB 50% left leg  DVT prophylaxis  Follow up with Anette in 2 weeks

## 2019-04-16 NOTE — PROGRESS NOTES
to admission pt walked with  No AD. Has ww      Initial Evaluation  Date:  4/16/19 Treatment      Short Term/ Long Term   Goals   Was pt agreeable to Eval/treatment? yes      Does pt have pain? L LE and L UE      Bed Mobility  Rolling: NT   Supine to sit:  Dep  assist x 2   Sit to supine:  NT   Scooting: dep assist    mod assist x 1-2    Transfers Sit to stand:  Dep assist   Stand to sit:  Dep assist   Stand pivot: dep assist   Mod assist x 1-2    Ambulation   NT   10  feet with  AAD ( possibly hemipost walker)  with  Mod assist x 1-2    LE ROM  Decreased throughout L LE      LE strength  L LE 2-to 3-/ 5   L LE 3 / 5    AM- PAC RAW score  6/ 24            Pt is alert and Oriented x  3 , questionable historian      Balance:  Dep assist stand, max assist sit    Endurance: poor   Chair alarm: Yes      ASSESSMENT  Pt displays functional ability as noted in the objective portion of this evaluation. Comments/Treatment:   Pt with difficulty following directions at times. Needs frequent cues to maintain UE precautions , [facility]   RN informed pt in chair. Call light in reach        Examination of body systems Decreased   Functional mobility x   ROM    Strength x   Safety Awareness    Cognition    Endurance x   Sensation    Balance x   Vision/Visual Deficits    Coordination        Patient education  Pt educated on  Fall risk, L UE precautions, PWB L LE     Patient response to education:   Pt verbalized understanding Pt demonstrated skill Pt requires further education in this area   no no  yes      Rehab potential is fair/Good for reaching above PT goals. Pts/ family goals   1. None stated     Patient and or family understand(s) diagnosis, prognosis, and plan of care. -  Yes ? PLAN  PT care will be provided in accordance with the objectives noted above. Whenever appropriate, clear delegation orders will be provided for nursing staff.   Exercises and functional mobility practice will be used as well as appropriate assistive devices or modalities to obtain goals. Patient and family education will also be administered as needed. Frequency of treatments will be daily x 3 days.     Time in: 0929  Time out:  [address] number:  PT [phone]

## 2019-04-16 NOTE — PROGRESS NOTES
P Quality Flow/Interdisciplinary Rounds Progress Note        Quality Flow Rounds held on April 16, 2019    Disciplines Attending:  Bedside Nurse, ,  and Nursing Unit [address] was admitted on 4/13/2019  9:07 PM    Anticipated Discharge Date:       Disposition:    Piter Score:  Piter Scale Score: 14    Readmission Risk              Risk of Unplanned Readmission:        20           Discussed patient goal for the day, patient clinical progression, and barriers to discharge.   The following Goal(s) of the Day/Commitment(s) have been identified:  Other  check to restart Eliquis      Springer Semen  April 16, 2019

## 2019-04-16 NOTE — PROGRESS NOTES
Internal Medicine Progress Note  Patient's name: Rishi Washington  : 1940  Admission date: 2019  Date of service: 2019   Primary care physician: Aime Bernard ([address])    [id number] [place]ie Wooten states that she had her surgery last night. She states that she is feeling well. She has no pain at this time and states that she has no acute complaints. She states that she feels everything is progressing as expected. Patient's  is at bedside and he states that he feels she is doing well as well. He has no questions or complaints at this time. Review of Systems  There are no new complaints of chest pain, shortness of breath, abdominal pain, nausea, vomiting, diarrhea, constipation.     Hospital Medications  Current Facility-Administered Medications   Medication Dose Route Frequency Provider Last Rate Last Dose    amiodarone (CORDARONE) tablet 200 mg  200 mg Oral BID Froy Mejia MD   200 mg at 04/15/19 2256    0.9 % sodium chloride infusion   Intravenous Continuous Froy Mejia MD 75 mL/hr at 04/15/19 2257      sodium chloride flush 0.9 % injection 10 mL  10 mL Intravenous 2 times per day Froy Mejia MD   10 mL at 04/15/19 2256    sodium chloride flush 0.9 % injection 10 mL  10 mL Intravenous PRN Froy Mejia MD        ceFAZolin (ANCEF) 2 g in dextrose 5 % 100 mL IVPB  2 g Intravenous Q8H Froy Mejia MD   Stopped at 19 0434    enoxaparin (LOVENOX) injection 40 mg  40 mg Subcutaneous Daily Froy Mejia MD        morphine (PF) injection 2 mg  2 mg Intravenous Q4H PRN Froy Mejia MD   2 mg at 19 0113    sodium chloride flush 0.9 % injection 10 mL  10 mL Intravenous 2 times per day Froy Mejia MD   10 mL at 04/15/19 2259    sodium chloride flush 0.9 % injection 10 mL  10 mL Intravenous PRN Froy Mejia MD   10 mL at 04/15/19 0901    magnesium hydroxide (MILK OF MAGNESIA) 400 MG/5ML suspension 30 mL 30 mL Oral Daily PRN Bhavya Mitchell MD        acetaminophen (TYLENOL) tablet 650 mg  650 mg Oral Q4H PRN Bhavya Mitchell MD        montelukast (SINGULAIR) tablet 10 mg  10 mg Oral Nightly Bhavya Mitchell MD   10 mg at 04/15/19 2256    vitamin D tablet 2,000 Units  2,000 Units Oral Nightly Bhavya Mitchell MD   2,000 Units at 04/15/19 2256    pantoprazole (PROTONIX) tablet 40 mg  40 mg Oral Daily Bhavya Mitchell MD   40 mg at 04/15/19 0931    furosemide (LASIX) tablet 40 mg  40 mg Oral Daily Bhavya Mitchell MD   40 mg at 04/15/19 0931    potassium chloride (KLOR-CON M) extended release tablet 20 mEq  20 mEq Oral BID Bhavya Mitchell MD   20 mEq at 04/15/19 2256    polycarbophil (FIBERCON) tablet 625 mg  625 mg Oral Daily Bhavya Mitchell MD   625 mg at 04/15/19 1326    phenylephrine-mineral oil-petrolatum (PREPARATION H) rectal ointment   Rectal TID PRN Bhavya Mitchell MD        cholestyramine Jesus[place]) packet 4 g  1 packet Oral Daily Bhavya Mitchell MD   4 g at 04/15/19 3657    loperamide (IMODIUM) capsule 2 mg  2 mg Oral 4x Daily PRN Bhavya Mitchell MD        diphenoxylate-atropine (LOMOTIL) 2.5-0.025 MG per tablet 1 tablet  1 tablet Oral 4x Daily PRN Bhavya Mitchell MD           PRN Medications  sodium chloride flush, morphine, sodium chloride flush, magnesium hydroxide, acetaminophen, phenylephrine-mineral oil-petrolatum, loperamide, diphenoxylate-atropine    Objective  Most Recent Recorded Vitals  /83   Pulse 90   Temp 96.3 °F (35.7 °C) (Axillary)   Resp 16   Ht 5' 1\" (1.549 m)   Wt 149 lb 6.4 oz (67.8 kg)   SpO2 100%   BMI 28.23 kg/m²   I/O last 3 completed shifts: In: 0693 [P.O.:120;  I.V.:965]  Out: 550 [Urine:450; Blood:100]  I/O this shift:  In: 60 [P.O.:60]  Out: 100 [Urine:100]    Physical Exam:  General: AAO to person/place/time/purpose, NAD, no labored breathing  Eyes: conjunctivae/corneas clear, sclera non icteric  Skin: uncertain   chronicity. 2. Globes and orbits are intact. CT Cervical Spine WO Contrast   Final Result   1. Degenerative changes are seen predominant in the facet joints at   C3-4 more on the left than on the right. 2. Central posterior disc extrusion seen at C3-4 and C4-5.      3. Findings compatible with diffuse idiopathic skeletal hyperostosis   as commented. 4. No acute fractures seen in the cervical spine. BLOOD CX #1  No results for input(s): BC in the last 72 hours. BLOOD CX #2  No results for input(s): Sharri Amis in the last 72 hours. TIP CULTURE  No results for input(s): CXCATHTIP in the last 72 hours. CULTURE, RESPIRATORY   No results for input(s): CULTRESP in the last 72 hours. RESPIRATORY SMEAR  No results for input(s): RESPSMEAR in the last 72 hours. BODY FLUID CULTURE  No results for input(s): BFCS in the last 72 hours. WOUND ABSCESS  No results for input(s): WNDABS in the last 72 hours. Anaerobic cx  No results for input(s): LABANAE in the last 72 hours. CULTURE SURGICAL  No results for input(s): CXSURG in the last 72 hours. URINE CULTURE  Recent Labs     04/13/19 2150   Deb Mckinney Growth present, evaluating for:  Mixed gram negative rods         No results for input(s): ORG in the last 72 hours. MISC. SENDOUT  No results for input(s): MREF in the last 72 hours. STREP PNEUMONIA AG URINE  No results for input(s): STREPNEUMAGU in the last 72 hours. LEGIONELLA AG URINE  No results for input(s): LEGUR in the last 72 hours. No results for input(s): [address] in the last 72 hours.       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]     Priority: Medium    Primary adenocarcinoma of ascending colon ([facility] [C18.2]     Priority: Medium    Anemia due to blood loss [D50.0]     Priority: Medium    Thalassemia trait [D56.3]     Priority: Medium    S/P IVC filter [W07.182]     Priority: Medium    H/o Duodenal ulcer [K26.9]     Priority: Medium    History of peptic ulcer disease [Z87.11]     Priority: Medium    History of pulmonary embolism [Z86.711]     Priority: Medium    Chronic deep vein thrombosis (DVT) of tibial vein of right lower extremity (HCC) [I82.541]     Priority: Medium    Chronic pulmonary embolism (HCC) [I27.82]     Priority: Medium    Persistent atrial fibrillation (HCC) [I48.1]     Priority: Medium    Colon cancer ([facility] [C18.9]    CAD (coronary artery disease) [I25.10]    Chronic deep vein thrombosis (DVT) of femoral vein of left lower extremity (HCC) [I82.512]    Hypertension [I10]    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 Post-op pain control per ortho, PT/OT eventually. Benefits outweighed risks to have surgery-- this was discussed w/ the . · Afib w/ RVR: Cardiology consultation-- defer rate/rhythm control meds to cardiology. TSH. Restart Eliquis if ok with ortho. · Chronic DVT/PE-IVC filter removed: Restart Eliquis if ok with ortho. · Acute on chronic diastolic CHF: Increase PO Lasix. · H/o colon cancer: She was under hospice care at home-- consult them. · Klebsiella UTI (POA): UA noted. Urine cx noted. IV Ancef. · Continue home medications.   · Follow labs  · DVT prophylaxis. · Please see orders for further management and care. ·  for discharge planning  · Discharge plan: TBD     Patient seen, examined, and discussed with Dr. Gianni Banuelos. Electronically signed by Sandra Sepulveda DO on 4/16/2019 at 9:58 AM     Addendum: I have personally participated in a face-to-face history and physical exam on the date of service with the patient. I have discussed the case with the resident. I also participated in medical decision making with the resident on the date of service and I agree with all of the pertinent clinical information unless indicated in my editing of the note. I have reviewed and edited the note above based on my findings during my history, exam, and decision making on the same day of service. Electronically signed by Phan Naidu DO on 4/16/2019 at 11:30 [address] (always forwarded to covering physician): [phone]. I can be reached through [employer] as well.

## 2019-04-16 NOTE — PROGRESS NOTES
Occupational Therapy  OCCUPATIONAL THERAPY INITIAL EVALUATION      Date:2019  Patient Name: Charbel Estrada  MRN: [id number]  : 1940  Room: [address] [place] [address]    Evaluating OT: Adolfo Pretty OTR/L [id number]    AM-PAC Daily Activity Raw Score:     Recommended Adaptive Equipment: TBD    Diagnosis: L femur fracture, L humeral neck fracture. Pt presents to ED post fall at home. Surgery: 4/15 L hip IM nailing   Pertinent Medical History: breast CA, CAD, colon CA, h/o R shoulder arthroplasty in Oct 2017  Precautions:  Falls, 50% weight bearing L LE, NWB L UE, sling L UE    OT conferred with nurse post session regarding potential order for shoulder immobilizer verses sling due to patients multiple and frequent attempts to mobilize L shoulder during functional tasks. Home Living: Pt is a questionable historian. Pt lives with  in a mobile home with a ramp on entry. Bathroom setup: walk in shower with seat and grab bars     Prior Level of Function: Mod I with ADLs, Mod I with IADLs; completed functional mobility no AD but has [employer]    Pain Level: pain in L hip and B shoulders with movement    Cognition: A&O: .  Pt is alert and oriented but confusion evident throughout session   Problem solving:  poor   Judgement/safety:  Poor               Direction followin step instruction with mod to max cues for follow through     Functional Assessment:   Initial Eval Status  Date: 19 Treatment session:  Short Term Goals  Treatment frequency: 2-5x/wk PRN x1-3 wks   Feeding Min A  Set up in armchair for breakfast     Grooming Max A  Min A   UB Dressing Max A  Including management of L sling providing hospital gown as a layer between sling and skin  Min A   LB Dressing Dependent  Max A    Bathing Max A  Mod A   Toileting Dependent  Mod A x1-2   Bed Mobility  Supine to sit: Dependent     Functional Transfers STS: Dependent  SPT: Dependent to armchair  Mod A x1-2   Functional Mobility NT  Max A with LRD during ADLs   Balance Sitting: initially poor able to achieve poor plus with sitting x4-5 min    Standing: poor     Activity Tolerance poor  standing aria x3-4 min with poor plus balance during self care tasks           ADL comments: Pt is limited in completion of self care tasks due to weakness, fatigue, L UE NWB and sling, L LE 50% weight bearing, pain with movement and decreased cognitive status     Strength ROM Additional Info:    RUE  Proximally: 2-/5  Distally: 3/5 Shoulder approx 15 degrees  Elbow to digits WFL   Edema pocketing at elbow poor  and FMC/dexterity noted during ADL tasks     LUE NT Shoulder NT, PROM forearm wrist and hand WFL  Pt attempts to utilize L UE functionally in all tasks with max cues for maintaining arm in sling        Hearing: WFL   Vision: WFL   Sensation:  Tingling in R digits  Tone: WFL   Edema: R elbow pocketing                            Comments/Treatment: Patient educated on adapted techniques for completion of ADL, safe functional transfers and mobility. Patient required cues for follow through with proper hand/foot placement, pacing, safety, attention, sequencing and technique in bed mobility, functional transfers, LB dressing, UB dressing, sling management and self feeding in preparation for maximum independence in all self care tasks. Eval Complexity: Mod  - Profile and History- expanded: review of medical records and additional review of physical, cognitive, or psychosocial history related to current functional performance  - Assessment of Occupational Performance and Identification of Deficits- L UE NWB, L UE sling, 50% weight bearing L LE, weakness, fatigue, balance deficits, decreased cognitive status  - Clinical Decision Making- Mod assistance or modifications required to perform tasks. May have comorbidities that affect occupational performance.     Assessment of current deficits   Functional mobility [x]  ADLs [x] Strength [x]  Cognition []  Functional transfers [x] IADLs [x] Safety Awareness [x]  Endurance [x]  Fine Motor Coordination [] Balance [x] Vision/perception [] Sensation []   Gross Motor Coordination [] ROM [] Delirium []                  Motor Control []    Plan of Care:   ADL retraining [x]   Equipment needs [x]   Neuromuscular re-education [x] Energy Conservation Techniques [x]  Functional Transfer training [x] Patient and/or Family Education [x]  Functional Mobility training [x]  Environmental Modifications [x]  Cognitive re-training []   Compensatory techniques for ADLs [x]  Splinting Needs []   Positioning to improve overall function [x]   Therapeutic Activity [x]  Therapeutic Exercise  [x]  Visual/Perceptual: []    Delirium prevention/treatment  []   Other:  []    Rehab Potential: Good for established goals     Patient / Family Goal: To get better. Patient and/or family were instructed on functional diagnosis, prognosis/goals and OT plan of care. Pt verbalized questionable understanding. Upon arrival, patient supine in bed. At end of session, patient seated in chair with call light and phone within reach, all lines and tubes intact. Pt would benefit from continued skilled OT to increase safety and independence with completion of ADL/IADL tasks for functional independence and quality of life. Bed/chair alarm: ON. Sling loosened and L UE positioned safely on a pillow support in chair post session.     Mod Evaluation + 10 timed treatment minutes  Tx Time in: 930  Tx Time out: 940    Evaluation time includes thorough review of current medical information, gathering information on past medical history/social history and prior level of function, completion of standardized testing/informal observation of tasks, assessment of data, and development of POC/Goals    Daryl Tello OTR/L  [id number]

## 2019-04-17 NOTE — DISCHARGE SUMMARY
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 female patient of [facility] [facility] ([address]) who  has a past medical history of Acute blood loss anemia, Acute upper GI bleed, Atrial fibrillation ([address] Breast cancer ([address] CAD (coronary artery disease), Chronic deep vein thrombosis (DVT) of tibial vein of right lower extremity (HCC), Chronic diastolic CHF (congestive heart failure) ([address] Chronic pulmonary embolism ([address] Colon cancer ([address] Full dentures, History of blood transfusion, History of fracture of right shoulder, History of peptic ulcer disease, History of pulmonary embolism, Hx of blood clots, Hypertension, Osteoarthritis of right shoulder, Psoriasis, Pulmonary hypertension ([address] Seasonal allergies, Skin cancer, Thalassemia trait, and Wears glasses. who originally had concerns including Fall and Hip Pain (left, +shortening and rotation). at presentation on 4/13/2019, and was found to have Closed intertrochanteric fracture of left femur, initial encounter [facility]) [S72.142A]  Closed intertrochanteric fracture of left femur, initial encounter ([facility] [S72.142A] after workup. Please see H&P for further details. HOSPITAL COURSE:   The patient presented to the hospital after a fall. She was found have a left hip fracture. She was at home under hospice care prior to coming in. She had some atrial fibrillation with rapid ventricular response. Cardiology saw the patient and just rhythm control medications. Orthopedic surgery saw the patient. Hemiarthroplasty was performed.   Anticoagulation was restarted after surgery due to history of DVT/PE. She was significantly weak. For this reason it was felt necessary for her to go to a skilled nursing facility for physical therapy and strength training. She had a bulla on her right upper extremity and also to be related to pressure. This improved during her hospital stay. She had significant edema and Lasix was changed to [facility] and his hospital stay. She had significant protein calorie malnutrition was started on dietary supplementation. On the day of discharge I discussed the case with cardiology with regard to rhythm control medications on discharge. BRIEF PHYSICAL EXAMINATION AND LABORATORIES ON DAY OF DISCHARGE:  VITALS:  /63   Pulse 104   Temp 98.1 °F (36.7 °C) (Oral)   Resp 18   Ht 5' 1\" (1.549 m)   Wt 146 lb 1.6 oz (66.3 kg)   SpO2 95%   BMI 27.61 kg/m²     Please see note from the same day.      LABS[de-identified]  Recent Labs     04/16/19  0456 04/17/19  0645 04/18/19  0335    138 139   K 4.2 4.4 3.8    107 105   CO2 23 23 23   BUN 13 16 17   CREATININE 0.6 0.7 0.6   GLUCOSE 113* 69* 86   CALCIUM 7.4* 7.6* 7.5*     Recent Labs     04/16/19  0230 04/17/19  0645 04/18/19  0335   ALKPHOS 179* 185* 217*   ALT 25 17 14   AST 45* 29 33*   PROT 4.0* 3.8* 4.0*   BILITOT 0.8 0.7 0.8   LABALBU 1.8* 1.7* 1.8*     Recent Labs     04/16/19  0456 04/17/19  0645 04/18/19  0335   WBC 5.7 5.7 6.2   RBC 4.12 3.57 3.58   HGB 10.6* 9.0* 9.1*   HCT 33.0* 28.5* 31.2*   MCV 80.1 79.8* 87.2   MCH 25.7* 25.2* 25.4*   MCHC 32.1 31.6* 29.2*   RDW 19.1* 19.0* 20.5*    141 152   MPV 12.1* 12.3* 12.2*     No results found for: LABA1C  Lab Results   Component Value Date    INR 1.7 04/17/2019    INR See note (AA) 04/17/2019    INR 1.1 04/16/2019    PROTIME 18.7 (H) 04/17/2019    PROTIME See note (AA) 04/17/2019    PROTIME 12.3 04/16/2019      Lab Results   Component Value Date    TSH 2.170 04/15/2019    TSH 0.389 11/07/2017     No results found for: TRIG, HDL, LDLCALC  No results for input(s): MG in the last 72 hours. No results for input(s): CKTOTAL, CKMB, TROPONINI in the last 72 hours. No results for input(s): LACTA in the last 72 hours. IMAGING:  Ct Head Wo Contrast    Result Date: 2019  Patient MRN:  [id number] : 1940 Age: 67 years Gender: Female Order Date:  2019 9:30 PM EXAM: CT HEAD WO CONTRAST COMPARISON: None INDICATION:  Fall yesterday  TECHNIQUE: Axial unenhanced CT scanning was performed through the head without the use of intravenous contrast. Low-dose CT acquisition technique included one of the following options; 1. Automated exposure control, 2. Adjustment of mA and/or kV according to the patient's size or 3. Use of iterative reconstruction. FINDINGS: There is area of chronic stroke involving right and left cerebellar hemispheres. No evidence of acute intracranial hemorrhage or edema. No abnormal extra-axial fluid collections. No hydrocephalus. Areas of hypoattenuation are present in periventricular white matter suggestive of areas of chronic microvascular ischemic change. No abnormal extra-axial fluid collections. There is no evidence of depressed calvarial fracture. 1. No evidence of acute intracranial hemorrhage or edema. 2. Chronic areas of stroke are seen involving right and left cerebellar hemispheres. Ct Facial Bones Wo Contrast    Result Date: 2019  Patient MRN:  [id number] : 1940 Age: 67 years Gender: Female Order Date:  2019 9:30 PM EXAM: CT FACIAL BONES WO CONTRAST COMPARISON: None INDICATION:  Fall, left cheek pain and bruising  TECHNIQUE:  Low-dose CT acquisition technique included one of the following options; 1. Automated exposure control, 2. Adjustment of mA and/or kV according to the patient's size or 3. Use of iterative reconstruction. FINDINGS: There is a mildly displaced fracture involving right nasal bone. There is no overlying soft tissue edema. Both orbits are intact.  Globes are intact. 1. Mildly displaced fracture involving right nasal bone of uncertain chronicity. 2. Globes and orbits are intact. Ct Cervical Spine Wo Contrast    Result Date: 2019  Patient MRN:  [id number] : 1940 Age: 67 years Gender: Female Order Date:  2019 9:30 PM TECHNIQUE/NUMBER OF IMAGES/COMPARISON/CLINICAL HISTORY: . CT scan cervical spine Sequential axial images were obtained sagittal coronal and oblique reconstructions 506 5 images. A 36-year-old female patient with history trauma. No prior studies available for comparison. FINDINGS: There are findings compatible with diffuse idiopathic hyperostosis with the bridging/near bridging prominent anterior osteophytes with a relative well maintained disc space through the mid lower cervical spine and upper thoracic spine and visualized this study. Vertebral bodies maintain preserved height. Degenerative changes are seen in between the odontoid process and anterior arch of C1. Articular relationship between the occipital condyles and C1 and between C1 and C2 are preserved. Degenerative changes are seen in the facet joints in mild to moderate degree on the left side of C3-4 and more discretely at C2-4 on the right side. Encroachment of the neural foramina are seen by the facet hypertrophy on the left side of C3-4. There is a central posterior disc extrusion at the C3-4 measuring about 4.5 mm in the anterior to posterior diameter by 7 mm in the transverse diameter by 7 mm in the superior to inferior diameter. It causes focal IMPRESSION the anterior aspect of the cord. There is also a smaller posterior disc extrusion at C4-5 level measuring 2.3 mm in the AP diameter by 7 mm in the transverse diameter by 7 mm in the superior to inferior diameter which also causes a mild focal IMPRESSION in the anterior aspect of the cord at that level but in lesser degree at and above the level. No acute fractures identified in the cervical spine.      1. Degenerative changes are seen predominant in the facet joints at C3-4 more on the left than on the right. 2. Central posterior disc extrusion seen at C3-4 and C4-5. 3. Findings compatible with diffuse idiopathic skeletal hyperostosis as commented. 4. No acute fractures seen in the cervical spine. Xr Shoulder Left (min 2 Views)    Result Date: 2019  Patient MRN:  [id number] : 1940 Age: 67 years Gender: Female Order Date:  2019 9:30 PM EXAM: XR SHOULDER LEFT (MIN 2 VIEWS) COMPARISON: None INDICATION: Pain VIEWS: Four views of the shoulder FINDINGS: There is a impacted fracture involving left humeral neck. Humeral head maintains articulation with the glenoid. There is generalized osteopenia. Comment clavicular joint is intact. Impacted fracture involving left humeral neck. Xr Chest Portable    Result Date: 2019  Patient MRN:  [id number] : 1940 Age: 67 years Gender: Female Order Date:  2019 9:30 PM EXAM: XR CHEST PORTABLE COMPARISON: 2018 INDICATION:  FAll, left chest pain FAll, left chest pain FINDINGS: Limited examination due to patient rotation. Opacities overlying left costophrenic sulcus. The heart is normal in size. No pneumothorax. Right shoulder arthroplasty present. 1. Limited examination due to rotation. 2. Opacities overlying left costophrenic sulcus which may be related to overlying soft tissue summation. Right lower lobe pneumonia, atelectasis, or effusion is possible. Repeat chest radiograph may be warranted for further evaluation. Fluoro For Surgical Procedures    Result Date: 4/15/2019  Patient MRN: [id number] : 1940 Age:  67 years Gender: Female Order Date: 4/15/2019 7:15 AM Exam: FLUORO FOR SURGICAL PROCEDURES Number of Images: 7 views Indication:  R52 Pain left hip ORIF Comparison: None. Findings: Patient undergoing left hip nailing for left hip fracture. Demographic pages submitted which demonstrates a 2.4 of fluoroscopy. 6 images are submitted. Images demonstrate evidence for undergoing nailing for left hip intertrochanteric fracture with orthopedic screw and proximal intramedullary kali in place. The left hip appears to be anatomical position. The detail of the report with intraoperative report. Xr Hip 2-3 Vw W Pelvis Left    Result Date: 2019  Patient MRN:  [id number] : 1940 Age: 67 years Gender: Female Order Date:  2019 9:30 PM EXAM: XR HIP 2-3 VW W PELVIS LEFT COMPARISON: Correlation made with prior from 2018 and 2018 INDICATION:  pain pain VIEWS: 2 FINDINGS: There is a fracture involving the intertrochanteric region of the left hip with mild increased varus angulation. There is displacement of lesser trochanteric fracture fragment below left femoral neck. Femoral head maintains articulation with the acetabulum. Intertrochanteric left hip fracture. MICROBIOLOGY:  BLOOD CX #1  No results for input(s): BC in the last 72 hours. BLOOD CX #2  No results for input(s): Kirti Cater in the last 72 hours. TIP CULTURE  No results for input(s): CXCATHTIP in the last 72 hours. CULTURE, RESPIRATORY   No results for input(s): CULTRESP in the last 72 hours. RESPIRATORY SMEAR  No results for input(s): RESPSMEAR in the last 72 hours. BODY FLUID CULTURE  No results for input(s): BFCS in the last 72 hours. WOUND ABSCESS  No results for input(s): WNDABS in the last 72 hours. Anaerobic cx  No results for input(s): LABANAE in the last 72 hours. CULTURE SURGICAL  No results for input(s): CXSURG in the last 72 hours. URINE CULTURE  No results for input(s): LABURIN in the last 72 hours. No results for input(s): ORG in the last 72 hours. MISC. SENDOUT  No results for input(s): MREF in the last 72 hours. STREP PNEUMONIA AG URINE  No results for input(s): STREPNEUMAGU in the last 72 hours. LEGIONELLA AG URINE  No results for input(s): LEGUR in the last 72 hours.   No results for input(s): [facility] in the last 72 hours. DISPOSITION:  The patient's condition is fair. The patient is being discharged to nursing home    DISCHARGE MEDICATIONS:    [facility] Medication Instructions [facility]    Printed on:19   Medication Information                      amiodarone (CORDARONE) 200 MG tablet  Take 1 tablet by mouth daily             Cholecalciferol (VITAMIN D3) 2000 units CAPS  Take 1 capsule by mouth nightly              cholestyramine (QUESTRAN) 4 g packet               diphenoxylate-atropine (LOMOTIL) 2.5-0.025 MG per tablet               ELIQUIS 5 MG TABS tablet  Take 5 mg by mouth 2 times daily             HYDROcodone-acetaminophen (NORCO) 5-325 MG per tablet  Take 1 tablet by mouth every 4 hours as needed for Pain for up to 7 days. Intended supply: 7 days.  Take lowest dose possible to manage pain             loperamide (IMODIUM A-D) 2 MG capsule  Take 2 mg by mouth 4 times daily as needed for Diarrhea             metoclopramide (REGLAN) 5 MG tablet  Take 1 tablet by mouth every 6 hours as needed (nausea)             metoprolol tartrate (LOPRESSOR) 25 MG tablet  Take 1 tablet by mouth 2 times daily             montelukast (SINGULAIR) 10 MG tablet  Take 10 mg by mouth nightly              pantoprazole (PROTONIX) 40 MG tablet  Take 1 tablet by mouth daily             phenylephrine-mineral oil-petrolatum (PREPARATION H) 0.25-14-74.9 % rectal ointment  Place rectally 3 times daily as needed for Hemorrhoids             polycarbophil (FIBERCON) 625 MG tablet  Take 1 tablet by mouth daily             potassium chloride (KLOR-CON M) 20 MEQ extended release tablet  Take 1 tablet by mouth daily (with breakfast)             torsemide (DEMADEX) 20 MG tablet  Take 1 tablet by mouth 2 times daily                 Discharge Medication List as of 2019  3:02 PM        Discharge Medication List as of 2019  3:02 PM      STOP taking these medications       furosemide (LASIX) 40 MG tablet Comments:   Reason for Stopping:         warfarin (COUMADIN) 5 MG tablet Comments:   Reason for Stopping:         ondansetron (ZOFRAN) 4 MG tablet Comments:   Reason for Stopping:             Discharge Medication List as of 4/18/2019  3:02 PM      START taking these medications    Details   torsemide (DEMADEX) 20 MG tablet Take 1 tablet by mouth 2 times daily, Disp-30 tablet, R-0DC to SNF      metoclopramide (REGLAN) 5 MG tablet Take 1 tablet by mouth every 6 hours as needed (nausea), Disp-30 tablet, R-0DC to SNF      amiodarone (CORDARONE) 200 MG tablet Take 1 tablet by mouth daily, Disp-30 tablet, R-0DC to SNF      HYDROcodone-acetaminophen (NORCO) 5-325 MG per tablet Take 1 tablet by mouth every 4 hours as needed for Pain for up to 7 days. Intended supply: 7 days. Take lowest dose possible to manage pain, Disp-42 tablet, R-0Print             INTERNAL MEDICINE FOLLOW UP/INSTRUCTIONS:  · Follow-up with primary care physician as directed in discharge paperwork. · Please review results of imaging studies with PCP. · Follow-up with all consultants as directed by them. · If recurrence or worsening of symptoms go to the ED or call primary care physician. · Diet: Dietary Nutrition Supplements: Standard High Calorie Oral Supplement  · DIET GENERAL;    Preparing for this patient's discharge, including paperwork, orders, instructions, and meeting with patient did required 34 minutes.     Electronically signed by Kei Trejo DO on 4/18/2019 at 7:03 PM

## 2019-04-17 NOTE — PROGRESS NOTES
P Quality Flow/Interdisciplinary Rounds Progress Note        Quality Flow Rounds held on April 17, 2019    Disciplines Attending:  Bedside Nurse, ,  and Nursing Unit [address] was admitted on 4/13/2019  9:07 PM    Anticipated Discharge Date:       Disposition:    Piter Score:  Piter Scale Score: 17    Readmission Risk              Risk of Unplanned Readmission:        20           Discussed patient goal for the day, patient clinical progression, and barriers to discharge.   The following Goal(s) of the Day/Commitment(s) have been identified:  Discharge - Obtain Order      Corazon Valdes  April 17, 2019

## 2019-04-17 NOTE — PROGRESS NOTES
Pt notified of importance of turning every 2 hours. Refusing to turn at this time due to pain from hip surgery.  Osvaldo Phillips

## 2019-04-17 NOTE — CARE COORDINATION
4/17/2019  Social Work Discharge Planning:  SW was informed by [employer] that Pt can come to [facility] but only after discontinuing hospice. And the Pt will need a 3 day stay here, from the day of hospice discontinuation before coming to the [facility].   Electronically signed by YADIEL Mccarty on 4/17/2019 at 3:26 PM

## 2019-04-17 NOTE — PLAN OF CARE
Problem: Pain:  Goal: Pain level will decrease  Description  Pain level will decrease  4/17/2019 0947 by Kavitha Mcnamara RN  Outcome: Met This Shift  4/17/2019 0039 by Oneal Baer RN  Outcome: Met This Shift  Goal: Control of acute pain  Description  Control of acute pain  4/17/2019 0947 by Kavitha Mcnamara RN  Outcome: Met This Shift  4/17/2019 0039 by Oneal Baer RN  Outcome: Met This Shift  Goal: Control of chronic pain  Description  Control of chronic pain  4/17/2019 0947 by Kavitha Mcnamara RN  Outcome: Met This Shift  4/17/2019 0039 by Oneal Baer RN  Outcome: Met This Shift     Problem: Risk for Impaired Skin Integrity  Goal: Tissue integrity - skin and mucous membranes  Description  Structural intactness and normal physiological function of skin and  mucous membranes. 4/17/2019 0947 by Kavitha Mcnamara RN  Outcome: Met This Shift  4/17/2019 0039 by Oneal Baer RN  Outcome: Met This Shift     Problem: Falls - Risk of:  Goal: Will remain free from falls  Description  Will remain free from falls  4/17/2019 0947 by Kavitha Mcnamara RN  Outcome: Met This Shift  4/17/2019 0039 by Oneal Baer RN  Outcome: Met This Shift  Goal: Absence of physical injury  Description  Absence of physical injury  4/17/2019 0947 by Kavitha Mcnamara RN  Outcome: Met This Shift  4/17/2019 0039 by Oneal Baer RN  Outcome: Met This Shift     Problem:  Activity:  Goal: Ability to tolerate increased activity will improve  Description  Ability to tolerate increased activity will improve  4/17/2019 0947 by Kavitha Mcnamara RN  Outcome: Met This Shift  4/17/2019 0039 by Oneal Baer RN  Outcome: Met This Shift     Problem: Cardiac:  Goal: Ability to maintain vital signs within normal range will improve  Description  Ability to maintain vital signs within normal range will improve  4/17/2019 0947 by Kavitha Mcnamara RN  Outcome: Met This Shift  4/17/2019 0039 by Oneal Baer RN  Outcome: Met This Shift  Goal: Cardiovascular alteration will improve  Description  Cardiovascular alteration will improve  4/17/2019 0947 by Viral Romero RN  Outcome: Met This Shift  4/17/2019 0039 by Flash Hoover RN  Outcome: Met This Shift

## 2019-04-17 NOTE — PLAN OF CARE
Problem: Pain:  Description  Pain management should include both nonpharmacologic and pharmacologic interventions. Goal: Pain level will decrease  Description  Pain level will decrease  Outcome: Met This Shift  Goal: Control of acute pain  Description  Control of acute pain  Outcome: Met This Shift  Goal: Control of chronic pain  Description  Control of chronic pain  Outcome: Met This Shift     Problem: Risk for Impaired Skin Integrity  Goal: Tissue integrity - skin and mucous membranes  Description  Structural intactness and normal physiological function of skin and  mucous membranes. Outcome: Met This Shift     Problem: Falls - Risk of:  Goal: Will remain free from falls  Description  Will remain free from falls  Outcome: Met This Shift  Goal: Absence of physical injury  Description  Absence of physical injury  Outcome: Met This Shift     Problem:  Activity:  Goal: Ability to tolerate increased activity will improve  Description  Ability to tolerate increased activity will improve  Outcome: Met This Shift     Problem: Cardiac:  Goal: Ability to maintain vital signs within normal range will improve  Description  Ability to maintain vital signs within normal range will improve  Outcome: Met This Shift  Goal: Cardiovascular alteration will improve  Description  Cardiovascular alteration will improve  Outcome: Met This Shift

## 2019-04-17 NOTE — PROGRESS NOTES
Internal Medicine Progress Note  Patient's name: Lucila Roberson  : 1940  Admission date: 2019  Date of service: 2019   Primary care physician: [address] ([address])    [address] NormanBrodie Wooten states that she is having difficulty feeding herself today. She has eggs on her front and she is trying to feed herself with her right hand but is unable to manipulate it properly. Patient also states that overnight she began having seepage from her right arm and that she has soaked through multiple pads. This is new. Patient denies any pain and states that other than being hungry she feels well today. Per nursing, right arm began seeping last night    Review of Systems  There are no new complaints of chest pain, shortness of breath, abdominal pain, nausea, vomiting, diarrhea, constipation.     Hospital Medications  Current Facility-Administered Medications   Medication Dose Route Frequency Provider Last Rate Last Dose    furosemide (LASIX) tablet 40 mg  40 mg Oral BID Woody Bae DO        apixaban (ELIQUIS) tablet 5 mg  5 mg Oral BID Vania Basilio MD   5 mg at 19    amiodarone (CORDARONE) tablet 200 mg  200 mg Oral BID Vania Basilio MD   200 mg at 19    sodium chloride flush 0.9 % injection 10 mL  10 mL Intravenous 2 times per day Vania Basilio MD   10 mL at 19    sodium chloride flush 0.9 % injection 10 mL  10 mL Intravenous PRN Vania Basilio MD        morphine (PF) injection 2 mg  2 mg Intravenous Q4H PRN Vania Basilio MD   2 mg at 19 0002    sodium chloride flush 0.9 % injection 10 mL  10 mL Intravenous 2 times per day Vania Basilio MD   10 mL at 04/15/19 2259    sodium chloride flush 0.9 % injection 10 mL  10 mL Intravenous PRN Vania Basilio MD   10 mL at 19 0003    magnesium hydroxide (MILK OF MAGNESIA) 400 MG/5ML suspension 30 mL  30 mL Oral Daily PRN Vania Basilio MD       [facility] acetaminophen (TYLENOL) tablet 650 mg  650 mg Oral Q4H PRN Ariana Robbins MD        montelukast (SINGULAIR) tablet 10 mg  10 mg Oral Nightly Ariana Robbins MD   10 mg at 04/16/19 2030    vitamin D tablet 2,000 Units  2,000 Units Oral Nightly Ariana Robbins MD   2,000 Units at 04/16/19 2030    pantoprazole (PROTONIX) tablet 40 mg  40 mg Oral Daily Ariana Robbins MD   40 mg at 04/16/19 8428    potassium chloride (KLOR-CON M) extended release tablet 20 mEq  20 mEq Oral BID Ariana Robbins MD   20 mEq at 04/16/19 2030    polycarbophil (FIBERCON) tablet 625 mg  625 mg Oral Daily Ariana Robbins MD   625 mg at 04/16/19 6121    phenylephrine-mineral oil-petrolatum (PREPARATION H) rectal ointment   Rectal TID PRN Ariana Robbins MD        cholestyramine John Alter) packet 4 g  1 packet Oral Daily Ariana Robbins MD   4 g at 04/16/19 7799    loperamide (IMODIUM) capsule 2 mg  2 mg Oral 4x Daily PRN Ariana Robbins MD        diphenoxylate-atropine (LOMOTIL) 2.5-0.025 MG per tablet 1 tablet  1 tablet Oral 4x Daily PRN Ariana Robbins MD           PRN Medications  sodium chloride flush, morphine, sodium chloride flush, magnesium hydroxide, acetaminophen, phenylephrine-mineral oil-petrolatum, loperamide, diphenoxylate-atropine    Objective  Most Recent Recorded Vitals  /72   Pulse 102   Temp 97.9 °F (36.6 °C) (Oral)   Resp 16   Ht 5' 1\" (1.549 m)   Wt 146 lb 14.4 oz (66.6 kg)   SpO2 94%   BMI 27.76 kg/m²   I/O last 3 completed shifts: In: 3129 [P.O.:900;  I.V.:585]  Out: 5 [Urine:420]  I/O this shift:  In: 120 [P.O.:120]  Out: 150 [Urine:150]    Physical Exam:  General: AAO to person/place/time/purpose, NAD, no labored breathing  Eyes: conjunctivae/corneas clear, sclera non icteric  Skin: color/texture/turgor normal, no rashes or lesions  Lungs: CTAB, no retractions/use of accessory muscles, no vocal fremitus, no rhonchi, no crackle, no rales  Heart: regular Priority: Medium    Anemia due to blood loss [D50.0]     Priority: Medium    Thalassemia trait [D56.3]     Priority: Medium    S/P IVC filter [D71.946]     Priority: Medium    H/o Duodenal ulcer [K26.9]     Priority: Medium    History of peptic ulcer disease [Z87.11]     Priority: Medium    History of pulmonary embolism [Z86.711]     Priority: Medium    Chronic deep vein thrombosis (DVT) of tibial vein of right lower extremity (HCC) [I82.541]     Priority: Medium    Chronic pulmonary embolism (HCC) [I27.82]     Priority: Medium    Persistent atrial fibrillation (HCC) [I48.1]     Priority: Medium    Colon cancer ([facility] [C18.9]    CAD (coronary artery disease) [I25.10]    Chronic deep vein thrombosis (DVT) of femoral vein of left lower extremity (HCC) [I82.512]    Hypertension [I10]    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 Post-op pain control per ortho, PT/OT 6/24. Benefits outweighed risks to have surgery-- this was discussed w/ the . · Afib w/ RVR: Cardiology consultation-- defer rate/rhythm control meds to cardiology. TSH. Restarted Eliquis. · Seeping Bull[place] right upper extremity: conservative management. May need wrapped  · Chronic DVT/PE-IVC filter removed: Eliquis. · Acute on chronic diastolic CHF: Increased PO Lasix. · H/o colon cancer: She was under hospice care at home-- can follow after SNF  · Klebsiella UTI (POA): UA noted. Urine cx noted. IV Ancef completed. · Continue home medications. · Follow labs  · DVT prophylaxis.   · Please see orders for further management and care.  ·  for discharge planning  · Discharge plan: SNF when bed availabe and pre-cert obtained-- likely later today      Patient seen, examined, and discussed with Dr. Katy Garcia. Electronically signed by Gail Arita DO on 4/17/2019 at 8:12 AM     Addendum: I have personally participated in a face-to-face history and physical exam on the date of service with the patient. I have discussed the case with the resident. I also participated in medical decision making with the resident on the date of service and I agree with all of the pertinent clinical information unless indicated in my editing of the note. I have reviewed and edited the note above based on my findings during my history, exam, and decision making on the same day of service. Electronically signed by Jennifer Loja DO on 4/17/2019 at 12:33 1700 PSafe (always forwarded to covering physician): [phone]. I can be reached through [employer] as well.

## 2019-04-17 NOTE — DISCHARGE SUMMARY
Internal Medicine Discharge Summary    NAME: Wilbert Mitchell :  1940  MRN:  [id number] [facility] ([address] [place] [address])    ADMITTED: 2019   DISCHARGED: No discharge date for patient encounter.     ADMITTING PHYSICIAN: Woody Bae DO    CONSULTANT(S):   IP CONSULT TO INTERNAL MEDICINE  IP CONSULT TO SOCIAL WORK  IP CONSULT TO ORTHOPEDIC SURGERY  IP CONSULT TO HOSPICE  IP CONSULT TO IV TEAM  IP CONSULT TO CARDIOLOGY  IP CONSULT TO IV TEAM  IP CONSULT TO IV TEAM  IP CONSULT TO IV TEAM     ADMITTING DIAGNOSIS:   Closed intertrochanteric fracture of left femur, initial encounter ([facility] [S72.142A]  Closed intertrochanteric fracture of left femur, initial encounter ([facility] [S72.142A]     Please see H&P for further details    DISCHARGE DIAGNOSES:   Active Hospital Problems    Diagnosis    Closed intertrochanteric fracture of left femur, initial encounter ([facility] [S72.142A]     Priority: High    Fx humeral neck, left, closed, initial encounter [S42.212A]     Priority: Medium    Chronic diastolic CHF (congestive heart failure) (HCC) [I50.32]     Priority: Medium    Chronic anticoagulation [Z79.01]     Priority: Medium    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]     Priority: Medium    Primary adenocarcinoma of ascending colon ([facility] [C18.2]     Priority: Medium    Anemia due to blood loss [D50.0]     Priority: Medium    Thalassemia trait [D56.3]     Priority: Medium    S/P IVC filter [P28.298]     Priority: Medium    H/o Duodenal ulcer [K26.9]     Priority: Medium    History of peptic ulcer disease [Z87.11]     Priority: Medium    History of pulmonary embolism [Z86.711]     Priority: Medium    Chronic deep vein thrombosis (DVT) of tibial vein of right lower extremity (HCC) [I82.541]     Priority: Medium    Chronic pulmonary embolism (HCC) [I27.82]     Priority: Medium    Persistent atrial fibrillation (HCC) [I48.1]     Priority: Medium    Colon cancer ([facility] [C18.9]    CAD (coronary artery disease) [I25.10]  Chronic deep vein thrombosis (DVT) of femoral vein of left lower extremity (HCC) [I82.512]    Hypertension [I10]    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 female patient of [address] [facility] ([address]) who  has a past medical history of Acute blood loss anemia, Acute upper GI bleed, Atrial fibrillation ([address] Breast cancer ([address] CAD (coronary artery disease), Chronic deep vein thrombosis (DVT) of tibial vein of right lower extremity (HCC), Chronic diastolic CHF (congestive heart failure) ([address] Chronic pulmonary embolism ([address] Colon cancer ([address] Full dentures, History of blood transfusion, History of fracture of right shoulder, History of peptic ulcer disease, History of pulmonary embolism, Hx of blood clots, Hypertension, Osteoarthritis of right shoulder, Psoriasis, Pulmonary hypertension ([address] Seasonal allergies, Skin cancer, Thalassemia trait, and Wears glasses. who originally had concerns including Fall and Hip Pain (left, +shortening and rotation). at presentation on 4/13/2019, and was found to have Closed intertrochanteric fracture of left femur, initial encounter [facility]) [S72.142A]  Closed intertrochanteric fracture of left femur, initial encounter ([facility] [S72.142A] after workup. Please see H&P for further details. HOSPITAL COURSE:   Patient initially presented to the ER on April 13 after having a fall. It was found that she had a fracture of her left humeral head as well as a close nondisplaced intertrochanteric fracture of her left femur.  Patient was previously on hospice, Ortho saw patient on the floor and elected to treat the humeral head fracture conservatively but did do a ORIF on the femoral fracture for comfort reasons. Patient had surgery done and is doing well at this time. Patient to be discharged back to hospice at this time. BRIEF PHYSICAL EXAMINATION AND LABORATORIES ON DAY OF DISCHARGE:  VITALS:  BP 90/60   Pulse 98   Temp 98.7 °F (37.1 °C) (Oral)   Resp 16   Ht 5' 1\" (1.549 m)   Wt 146 lb 14.4 oz (66.6 kg)   SpO2 96%   BMI 27.76 kg/m²     Please see note from the same day. LABS[de-identified]  Recent Labs     19  0230 19  0456 19  0645    138 138   K 4.4 4.2 4.4    106 107   CO2 23 23 23   BUN 13 13 16   CREATININE 0.5 0.6 0.7   GLUCOSE 110* 113* 69*   CALCIUM 7.1* 7.4* 7.6*     Recent Labs     04/15/19  0900 19  0230 19  0645   ALKPHOS 270* 179* 185*   ALT 37* 25 17   AST 79* 45* 29   PROT 5.5* 4.0* 3.8*   BILITOT 1.4* 0.8 0.7   LABALBU 2.5* 1.8* 1.7*     Recent Labs     04/15/19  0919  0456 19  0645   WBC 6.1 5.7 5.7   RBC 5.18 4.12 3.57   HGB 13.3 10.6* 9.0*   HCT 40.9 33.0* 28.5*   MCV 79.0* 80.1 79.8*   MCH 25.7* 25.7* 25.2*   MCHC 32.5 32.1 31.6*   RDW 19.9* 19.1* 19.0*    145 141   MPV 12.0 12.1* 12.3*     No results found for: LABA1C  Lab Results   Component Value Date    INR 1.7 2019    INR See note (AA) 2019    INR 1.1 2019    PROTIME 18.7 (H) 2019    PROTIME See note (AA) 2019    PROTIME 12.3 2019      Lab Results   Component Value Date    TSH 2.170 04/15/2019    TSH 0.389 2017     No results found for: TRIG, HDL, LDLCALC  No results for input(s): MG in the last 72 hours. No results for input(s): CKTOTAL, CKMB, TROPONINI in the last 72 hours. No results for input(s): LACTA in the last 72 hours.     IMAGING:  Ct Head Wo Contrast    Result Date: 2019  Patient MRN:  [id number] : 1940 Age: 67 years Gender: Female Order Date:  2019 9:30 PM EXAM: CT HEAD WO CONTRAST COMPARISON: None INDICATION:  Fall yesterday  TECHNIQUE: Axial unenhanced CT scanning was performed through the head without the use of intravenous contrast. Low-dose CT acquisition technique included one of the following options; 1. Automated exposure control, 2. Adjustment of mA and/or kV according to the patient's size or 3. Use of iterative reconstruction. FINDINGS: There is area of chronic stroke involving right and left cerebellar hemispheres. No evidence of acute intracranial hemorrhage or edema. No abnormal extra-axial fluid collections. No hydrocephalus. Areas of hypoattenuation are present in periventricular white matter suggestive of areas of chronic microvascular ischemic change. No abnormal extra-axial fluid collections. There is no evidence of depressed calvarial fracture. 1. No evidence of acute intracranial hemorrhage or edema. 2. Chronic areas of stroke are seen involving right and left cerebellar hemispheres. Ct Facial Bones Wo Contrast    Result Date: 2019  Patient MRN:  [id number] : 1940 Age: 67 years Gender: Female Order Date:  2019 9:30 PM EXAM: CT FACIAL BONES WO CONTRAST COMPARISON: None INDICATION:  Fall, left cheek pain and bruising  TECHNIQUE:  Low-dose CT acquisition technique included one of the following options; 1. Automated exposure control, 2. Adjustment of mA and/or kV according to the patient's size or 3. Use of iterative reconstruction. FINDINGS: There is a mildly displaced fracture involving right nasal bone. There is no overlying soft tissue edema. Both orbits are intact. Globes are intact. 1. Mildly displaced fracture involving right nasal bone of uncertain chronicity. 2. Globes and orbits are intact. Ct Cervical Spine Wo Contrast    Result Date: 2019  Patient MRN:  [id number] : 1940 Age: 67 years Gender: Female Order Date:  2019 9:30 PM TECHNIQUE/NUMBER OF IMAGES/COMPARISON/CLINICAL HISTORY: . CT scan cervical spine Sequential axial images were obtained sagittal coronal and oblique reconstructions 506 5 images.  A 78-year-old female patient with history trauma. No prior studies available for comparison. FINDINGS: There are findings compatible with diffuse idiopathic hyperostosis with the bridging/near bridging prominent anterior osteophytes with a relative well maintained disc space through the mid lower cervical spine and upper thoracic spine and visualized this study. Vertebral bodies maintain preserved height. Degenerative changes are seen in between the odontoid process and anterior arch of C1. Articular relationship between the occipital condyles and C1 and between C1 and C2 are preserved. Degenerative changes are seen in the facet joints in mild to moderate degree on the left side of C3-4 and more discretely at C2-4 on the right side. Encroachment of the neural foramina are seen by the facet hypertrophy on the left side of C3-4. There is a central posterior disc extrusion at the C3-4 measuring about 4.5 mm in the anterior to posterior diameter by 7 mm in the transverse diameter by 7 mm in the superior to inferior diameter. It causes focal IMPRESSION the anterior aspect of the cord. There is also a smaller posterior disc extrusion at C4-5 level measuring 2.3 mm in the AP diameter by 7 mm in the transverse diameter by 7 mm in the superior to inferior diameter which also causes a mild focal IMPRESSION in the anterior aspect of the cord at that level but in lesser degree at and above the level. No acute fractures identified in the cervical spine. 1. Degenerative changes are seen predominant in the facet joints at C3-4 more on the left than on the right. 2. Central posterior disc extrusion seen at C3-4 and C4-5. 3. Findings compatible with diffuse idiopathic skeletal hyperostosis as commented. 4. No acute fractures seen in the cervical spine.      Xr Shoulder Left (min 2 Views)    Result Date: 2019  Patient MRN:  [id number] : 1940 Age: 67 years Gender: Female Order Date:  2019 9:30 PM EXAM: XR SHOULDER LEFT (MIN 2 VIEWS) COMPARISON: None INDICATION: Pain VIEWS: Four views of the shoulder FINDINGS: There is a impacted fracture involving left humeral neck. Humeral head maintains articulation with the glenoid. There is generalized osteopenia. Comment clavicular joint is intact. Impacted fracture involving left humeral neck. Xr Chest Portable    Result Date: 2019  Patient MRN:  [id number] : 1940 Age: 67 years Gender: Female Order Date:  2019 9:30 PM EXAM: XR CHEST PORTABLE COMPARISON: 2018 INDICATION:  FAll, left chest pain FAll, left chest pain FINDINGS: Limited examination due to patient rotation. Opacities overlying left costophrenic sulcus. The heart is normal in size. No pneumothorax. Right shoulder arthroplasty present. 1. Limited examination due to rotation. 2. Opacities overlying left costophrenic sulcus which may be related to overlying soft tissue summation. Right lower lobe pneumonia, atelectasis, or effusion is possible. Repeat chest radiograph may be warranted for further evaluation. Fluoro For Surgical Procedures    Result Date: 4/15/2019  Patient MRN: [id number] : 1940 Age:  67 years Gender: Female Order Date: 4/15/2019 7:15 AM Exam: FLUORO FOR SURGICAL PROCEDURES Number of Images: 7 views Indication:  R52 Pain left hip ORIF Comparison: None. Findings: Patient undergoing left hip nailing for left hip fracture. Demographic pages submitted which demonstrates a 2.4 of fluoroscopy. 6 images are submitted. Images demonstrate evidence for undergoing nailing for left hip intertrochanteric fracture with orthopedic screw and proximal intramedullary kali in place. The left hip appears to be anatomical position. The detail of the report with intraoperative report.      Xr Hip 2-3 Vw W Pelvis Left    Result Date: 2019  Patient MRN:  [id number] : 1940 Age: 67 years Gender: Female Order Date:  2019 9:30 PM EXAM: XR HIP 2-3 VW W PELVIS LEFT COMPARISON: Correlation made with prior from January 31, 2018 and February 18, 2018 INDICATION:  pain pain VIEWS: 2 FINDINGS: There is a fracture involving the intertrochanteric region of the left hip with mild increased varus angulation. There is displacement of lesser trochanteric fracture fragment below left femoral neck. Femoral head maintains articulation with the acetabulum. Intertrochanteric left hip fracture. MICROBIOLOGY:  BLOOD CX #1  No results for input(s): BC in the last 72 hours. BLOOD CX #2  No results for input(s): [employer] in the last 72 hours. TIP CULTURE  No results for input(s): CXCATHTIP in the last 72 hours. CULTURE, RESPIRATORY   No results for input(s): CULTRESP in the last 72 hours. RESPIRATORY SMEAR  No results for input(s): RESPSMEAR in the last 72 hours. BODY FLUID CULTURE  No results for input(s): BFCS in the last 72 hours. WOUND ABSCESS  No results for input(s): WNDABS in the last 72 hours. Anaerobic cx  No results for input(s): LABANAE in the last 72 hours. CULTURE SURGICAL  No results for input(s): CXSURG in the last 72 hours. URINE CULTURE  No results for input(s): LABURIN in the last 72 hours. No results for input(s): ORG in the last 72 hours. MISC. SENDOUT  No results for input(s): MREF in the last 72 hours. STREP PNEUMONIA AG URINE  No results for input(s): STREPNEUMAGU in the last 72 hours. LEGIONELLA AG URINE  No results for input(s): LEGUR in the last 72 hours. No results for input(s): [facility] in the last 72 hours. DISPOSITION:  The patient's condition is fair.    The patient is being discharged to nursing home    [facility] Joann:    [facility] Medication Instructions [id number]    Printed on:04/17/19 1219   Medication Information                      Cholecalciferol (VITAMIN D3) 2000 units CAPS  Take 1 capsule by mouth nightly              cholestyramine (QUESTRAN) 4 g packet               diphenoxylate-atropine (LOMOTIL)

## 2019-04-17 NOTE — PROGRESS NOTES
Physical Therapy  Facility/Department: [address] INTERNAL MEDICINE 2  Daily Treatment Note  NAME: Ernestina Ramirez  : 1940  MRN: [id number]    Date of Service: 2019    Patient Diagnosis(es): The primary encounter diagnosis was Closed nondisplaced intertrochanteric fracture of left femur, initial encounter ([address] Diagnoses of Fracture of humeral head, closed, left, initial encounter, Acute cystitis without hematuria, and Pain were also pertinent to this visit. has a past medical history of Acute blood loss anemia, Acute upper GI bleed, Atrial fibrillation ([address] Breast cancer ([address] CAD (coronary artery disease), Chronic deep vein thrombosis (DVT) of tibial vein of right lower extremity (HCC), Chronic diastolic CHF (congestive heart failure) ([address] Chronic pulmonary embolism ([address] Colon cancer ([address] Full dentures, History of blood transfusion, History of fracture of right shoulder, History of peptic ulcer disease, History of pulmonary embolism, Hx of blood clots, Hypertension, Osteoarthritis of right shoulder, Psoriasis, Pulmonary hypertension ([address] Seasonal allergies, Skin cancer, Thalassemia trait, and Wears glasses. has a past surgical history that includes Tonsillectomy; Breast lumpectomy (Right); Breast lumpectomy (Left); Total shoulder arthroplasty (Right, 10/23/2017); Upper gastrointestinal endoscopy (2017); Vena Cava Filter Placement (2017); Upper gastrointestinal endoscopy (2017); Esophageal varice ligation (2017); Colon surgery (2018); Colonoscopy (2017); Colonoscopy (2018); Colonoscopy (N/A, 2018); and Hip fracture surgery (Left, 4/15/2019).     Evaluating Therapist: Sharmaine Boyce PT         Room #: 419   DIAGNOSIS:  Falls, closed L IT fx s/p IM nailing, L humeral neck fx ( closed reduction )      PRECAUTIONS: falls, 50 % L LE PWB,  L UE NWB and sling      Social:  Pt lives with spouse  in a  1  floor plan mobile home with ramp  to enter.  Prior to admission pt walked with  No AD. Has ww        Initial Evaluation  Date:  4/16/19 Treatment     4/17/19 Short Term/ Long Term   Goals   Was pt agreeable to Eval/treatment? yes  yes      Does pt have pain? L LE and L UE  L LE and UE      Bed Mobility  Rolling: NT   Supine to sit:  Dep  assist x 2   Sit to supine:  NT   Scooting: dep assist   supine to sit dependent of 2  mod assist x 1-2    Transfers Sit to stand:  Dep assist   Stand to sit:  Dep assist   Stand pivot: dep assist   dependent scoot/low pivot to chair  Sit <> stand dependent, pt unable to fully stand Mod assist x 1-2    Ambulation   NT    10  feet with  AAD ( possibly hemipost walker)  with  Mod assist x 1-2    LE ROM  Decreased throughout L LE        LE strength  L LE 2-to 3-/ 5    L LE 3 / 5    AM- PAC RAW score  6/ 24 6/24             Patient education  Pt was educated on WB status    Patient response to education:   Pt verbalized understanding Pt demonstrated skill Pt requires further education in this area   yes With cues yes     Additional Comments: Pt with poor sitting balance leans to right seated edge of bed and sitting in chair. Drainage noted from R UE    Pt was left in chair with call light left by patient. Pt is making limited progress toward established Physical Therapy goals. Continue with physical therapy current plan of care.     Bettina HARRIS [id number]

## 2019-04-17 NOTE — CARE COORDINATION
4/17/2019  Social Work Discharge Planning:SW set up ambulance transpprt via [facility] for Pt to go to [facility] today at [facility]. Nurse here, Alexus at [facility] and spouse were notified.  Electronically signed by YADIEL Villavicencio on 4/17/2019 at 1:01 PM

## 2019-04-17 NOTE — CARE COORDINATION
4/17/2019  Social Work Discharge Planning:  SW cancelled transport and notified spouse. SW was just informed by spouse that Pt is active with [address]. SW was informed by Fiona Myers from [facility] that Pt cannot come to [facility] if active with hospice  because ins. will not pay for hospice AND [facility]. SW then called Angelica Ching at hospice and asked her to call spouse to discuss what he would like to do-[facility] or hospice. Waiting return call. Electronically signed by YADIEL Gordillo on 4/17/2019 at 1:23 PM

## 2019-04-17 NOTE — PROGRESS NOTES
Dr. Maria Guadalupe Miller made aware pt's discharge is on hold due to pt being under the care of Hospice and the facility will not accept pt under their care.  [facility]

## 2019-04-17 NOTE — CARE COORDINATION
4/17/2019  Social Work Discharge Planning:  Sw was informed by [address] at [address] [place] [address] that Pt is not active with them. CHADD confirmed with Alexus that Pt will go to to [facility]. [facility] when medically ready. Click 6-no precert needed. N-17 generated, hens completed and transport form is in chart. Electronically signed by YADIEL Ibanez on 4/17/2019 at 11:36 AM

## 2019-04-17 NOTE — DISCHARGE INSTR - COC
Continuity of Care Form    Patient Name: Alexandra Ramires   :  1940  MRN:  [id number]    Admit date:  2019  Discharge date:  2019    Code Status Order: Full Code   Advance Directives:   [facility] Documentation     Date/Time Healthcare Directive Type of Healthcare Directive Copy in [address] Agent's Name Healthcare Agent's Phone Number    [phone]  Yes, patient has an advance directive for healthcare treatment  --  --  --  --  --          Admitting Physician:  Jess Carpio DO  PCP: [address] [facility] [facility] ([address])    Discharging Nurse: [facility] Unit/Room#: [facility] Unit Phone Number: [phone]    Emergency Contact:   Extended Emergency Contact Information  Primary Emergency Contact: Hany Zimmer  Address: [address] [facility] Phone: [phone]  Mobile Phone: [phone]  Relation: Spouse  Secondary Emergency Contact: Julienne Gallardo  Address: [address] [facility] Phone: [phone]  Relation: Child    Past Surgical History:  Past Surgical History:   Procedure Laterality Date    BREAST LUMPECTOMY Right     for cancer, no further treatment    BREAST LUMPECTOMY Left     no further treatment    COLON SURGERY  2018    rt hemicolectomy, duodenal resection.     COLONOSCOPY  2017    polypectomy    COLONOSCOPY  2018    COLONOSCOPY N/A 2018    COLONOSCOPY DIAGNOSTIC performed by Gideon Drake MD at [facility]  2017    HIP FRACTURE SURGERY Left 4/15/2019    LEFT HIP OPEN REDUCTION INTERNAL FIXATION performed by Angelina Alas MD at [facility] 10/23/2017    osteoarthritis     TONSILLECTOMY      UPPER GASTROINTESTINAL ENDOSCOPY  2017    D/t UGI bleed; was found to have a duodenal ulcer    UPPER GASTROINTESTINAL ENDOSCOPY  12/28/2017    with biopsy, banding of varices x4    VENA CAVA FILTER PLACEMENT  11/09/2017    In the setting of DVT and PE complicated by acute UGI bleed, duodenal ulcer, removed.          Immunization History:   Immunization History   Administered Date(s) Administered    Influenza Virus Vaccine 09/15/2017       Active Problems:  Patient Active Problem List   Diagnosis Code    S/P reverse total shoulder arthroplasty, right Z96.611    Essential hypertension I10    Psoriasis L40.9    Chronic pulmonary embolism (HCC) I27.82    Persistent atrial fibrillation (HCC) I48.1    History of pulmonary embolism Z86.711    Chronic deep vein thrombosis (DVT) of tibial vein of right lower extremity (HCC) I82.541    S/P IVC filter Z95.828    Anemia due to blood loss D50.0    Thalassemia trait D56.3    B12 deficiency E53.8    H/o Duodenal ulcer K26.9    Pulmonary hypertension (HCC) I27.20    Primary adenocarcinoma of ascending colon (HCC) C18.2    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    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    Chronic anticoagulation Z79.01    Hypertension I10    History of peptic ulcer disease Z87.11    Chronic diastolic CHF (congestive heart failure) (HCC) I50.32    Chronic deep vein thrombosis (DVT) of femoral vein of left lower extremity (HCC) I82.512    Closed intertrochanteric fracture of left femur, initial encounter ([facility]) S72.142A    Colon cancer (HCC) C18.9    CAD (coronary artery disease) I25.10    Atrial fibrillation (HCC) I48.91    Fx humeral neck, left, closed, initial encounter S42.212A       Isolation/Infection:   Isolation          No Isolation            Nurse Assessment:  Last Vital Signs: BP 90/60   Pulse 98   Temp 98.7 °F (37.1 °C) (Oral)   Resp 16   Ht 5' 1\" (1.549 m)   Wt 146 lb 14.4 oz (66.6 kg)   SpO2 96%   BMI 27.76 kg/m²     Last documented pain score (0-10 scale): Pain Level: 0  Last Weight:   Wt Readings from Last 1 Discharge: none    Physician Certification: I certify the above information and transfer of Jessica Sandovla  is necessary for the continuing treatment of the diagnosis listed and that she requires skilled snf for greater 30 days.      Update Admission H&P: No change in H&P    PHYSICIAN SIGNATURE:  Electronically signed by Carmen Peterson DO on 4/17/19 at 12:18 PM

## 2019-04-17 NOTE — FLOWSHEET NOTE
Inpatient Wound Care    Admit Date: 4/13/2019  9:07 PM    Reason for consult:  DTI buttocks, R arm seeping    Significant history:  Admitted with closed L femur fracture and L humeral fracture s/p repair. History includes: PE, HTN, colon cancer    Findings:       04/17/19 1532   Wound 04/17/19 Buttocks Right   Date First Assessed/Time First Assessed: 04/17/19 1532   Location: Buttocks  Wound Location Orientation: Right   Wound Image    Wound Deep tissue/Injury   Wound Length (cm) 3.5 cm   Wound Width (cm) 6 cm   Wound Surface Area (cm^2) 21 cm^2   Wound Assessment Painful;Purple  (Adjacent DTI 3 x 2 cm)   Drainage Amount None   Odor None   Laura-wound Assessment Fragile       Impression:  DTI R buttocks, heels intact, R arm seeping    Interventions in place:  Mepilex removed from sacrum. Will treat with Aquaphor. R arm cleansed then Adaptic , Opticell , ABD and kerlix applied to contain the exudate seeping from puncture areas. Plan: Aquaphor to buttocks, SOS precautions, dressings to R arm.        Andrea Motta 4/17/2019 3:35 PM

## 2019-04-17 NOTE — CARE COORDINATION
4/17/2019  Social Work Discharge Planning:  SW tried to call Pts spouse. No answer on either phone. Left voicemail. Electronically signed by YADIEL Mccarty on 4/17/2019 at 2:09 PM

## 2019-04-18 NOTE — PROGRESS NOTES
Physical Therapy  Facility/Department: [address] INTERNAL MEDICINE 2  Daily Treatment Note  NAME: Alicja Jiménez  : 1940  MRN: [id number]    Date of Service: 2019    Patient Diagnosis(es): The primary encounter diagnosis was Closed nondisplaced intertrochanteric fracture of left femur, initial encounter ([address] Diagnoses of Fracture of humeral head, closed, left, initial encounter, Acute cystitis without hematuria, and Pain were also pertinent to this visit. has a past medical history of Acute blood loss anemia, Acute upper GI bleed, Atrial fibrillation ([address] Breast cancer ([address] CAD (coronary artery disease), Chronic deep vein thrombosis (DVT) of tibial vein of right lower extremity (HCC), Chronic diastolic CHF (congestive heart failure) ([address] Chronic pulmonary embolism ([address] Colon cancer ([address] Full dentures, History of blood transfusion, History of fracture of right shoulder, History of peptic ulcer disease, History of pulmonary embolism, Hx of blood clots, Hypertension, Osteoarthritis of right shoulder, Psoriasis, Pulmonary hypertension ([address] Seasonal allergies, Skin cancer, Thalassemia trait, and Wears glasses. has a past surgical history that includes Tonsillectomy; Breast lumpectomy (Right); Breast lumpectomy (Left); Total shoulder arthroplasty (Right, 10/23/2017); Upper gastrointestinal endoscopy (2017); Vena Cava Filter Placement (2017); Upper gastrointestinal endoscopy (2017); Esophageal varice ligation (2017); Colon surgery (2018); Colonoscopy (2017); Colonoscopy (2018); Colonoscopy (N/A, 2018); and Hip fracture surgery (Left, 4/15/2019).     Evaluating Therapist: Ana Brush PT         Room #: 419   Naty Treviño, closed L IT fx s/p IM nailing, L humeral neck fx ( closed reduction )      PRECAUTIONS: falls, 50 % L LE PWB,  L UE NWB and sling      Social:  Pt lives with spouse  in a  1  floor plan mobile home with ramp  to enter.  Prior to admission pt walked with  No AD. Has ww        Initial Evaluation  Date:  4/16/19 Treatment     4/18/19 Short Term/ Long Term   Goals   Was pt agreeable to Eval/treatment?  yes  yes      Does pt have pain? L LE and L UE  L LE and UE      Bed Mobility  Rolling: NT   Supine to sit:  Dep  assist x 2   Sit to supine:  NT   Scooting: dep assist   supine to sit dependent of 2  mod assist x 1-2    Transfers Sit to stand:  Dep assist   Stand to sit:  Dep assist   Stand pivot: dep assist   sit to stand dependent  Stand pivot to chair dependent of 2 Mod assist x 1-2    Ambulation   NT    10  feet with  AAD ( possibly hemipost walker)  with  Mod assist x 1-2    LE ROM  Decreased throughout L LE        LE strength  L LE 2-to 3-/ 5    L LE 3 / 5    AM- PAC RAW score  6/ 24 6/24            Pt performed therapeutic exercise of the following: seated LAQ and ankle pumps,. Sitting balance and trunk control seated edge of bed. Patient education  Pt was educated on NWB L UE    Patient response to education:   Pt verbalized understanding Pt demonstrated skill Pt requires further education in this area   yes With cues yes     Additional Comments: Pt instructed in NWB L UE and PWB L LE, difficulty maintaining with transfer. Pt pushed posterior seated edge of bed and in chair causing her to slide forward out of chair. Pt positioned in chair with pillows and chair reclined back to prevent pt from pushing posterior. Pt willing to participate with PT however limited by WB status at this time. Pt was left in chair with call light left by patient. Chair/bed alarm: yes      Pt is making limited progress toward established Physical Therapy goals. Continue with physical therapy current plan of care.     Bettina PT [id number]

## 2019-04-18 NOTE — PROGRESS NOTES
Internal Medicine Progress Note  Patient's name: Sherry Daily  : 1940  Admission date: 2019  Date of service: 2019   Primary care physician: [address] ([address])    [address] NormanBrodie Wooten states that she is feeling fine today. She did have an episode of vomiting after eating but she states that \"I ate too fast\". Her pain is controlled. She still has edema. She would like for the goal of her care to be comfort still. Per nursing, no other new issues besides vomiting    Right arm edema and weeping seems to be somewhat improved. Review of Systems  There are no new complaints of chest pain, shortness of breath, abdominal pain, nausea, vomiting, diarrhea, constipation.     Hospital Medications  Current Facility-Administered Medications   Medication Dose Route Frequency Provider Last Rate Last Dose    mineral oil-hydrophilic petrolatum (AQUAPHOR) ointment   Topical BID Woody Bae DO        furosemide (LASIX) tablet 40 mg  40 mg Oral BID Woody Bae DO   40 mg at 19 1633    apixaban (ELIQUIS) tablet 5 mg  5 mg Oral BID Gregory Medina MD   5 mg at 19    amiodarone (CORDARONE) tablet 200 mg  200 mg Oral BID Gregory Medina MD   200 mg at 19    sodium chloride flush 0.9 % injection 10 mL  10 mL Intravenous 2 times per day Gregory Medina MD   10 mL at 19 0815    sodium chloride flush 0.9 % injection 10 mL  10 mL Intravenous PRN Gregory Medina MD        morphine (PF) injection 2 mg  2 mg Intravenous Q4H PRN Gregory Medina MD   2 mg at 19 0002    sodium chloride flush 0.9 % injection 10 mL  10 mL Intravenous 2 times per day Gregory Medina MD   10 mL at 19 2325    sodium chloride flush 0.9 % injection 10 mL  10 mL Intravenous PRN Gregory Medina MD   10 mL at 19 0003    magnesium hydroxide (MILK OF MAGNESIA) 400 MG/5ML suspension 30 mL  30 mL Oral Daily PRN Gregory Medina MD       [facility] acetaminophen (TYLENOL) tablet 650 mg  650 mg Oral Q4H PRN Fady Samuel MD        montelukast (SINGULAIR) tablet 10 mg  10 mg Oral Nightly Fady Samuel MD   10 mg at 04/17/19 2022    vitamin D tablet 2,000 Units  2,000 Units Oral Nightly Fady Samuel MD   2,000 Units at 04/17/19 2022    pantoprazole (PROTONIX) tablet 40 mg  40 mg Oral Daily Fady Samuel MD   40 mg at 04/17/19 6848    potassium chloride (KLOR-CON M) extended release tablet 20 mEq  20 mEq Oral BID Fady Samuel MD   20 mEq at 04/17/19 2022    polycarbophil (FIBERCON) tablet 625 mg  625 mg Oral Daily Fady Samuel MD   625 mg at 04/17/19 0343    phenylephrine-mineral oil-petrolatum (PREPARATION H) rectal ointment   Rectal TID PRN Fady Samuel MD        cholestyramine Larry Flash) packet 4 g  1 packet Oral Daily Fady Samuel MD   4 g at 04/17/19 9361    loperamide (IMODIUM) capsule 2 mg  2 mg Oral 4x Daily PRN Fady Samuel MD        diphenoxylate-atropine (LOMOTIL) 2.5-0.025 MG per tablet 1 tablet  1 tablet Oral 4x Daily PRN Fady Samuel MD           PRN Medications  sodium chloride flush, morphine, sodium chloride flush, magnesium hydroxide, acetaminophen, phenylephrine-mineral oil-petrolatum, loperamide, diphenoxylate-atropine    Objective  Most Recent Recorded Vitals  BP (!) 96/58   Pulse 102   Temp 97.6 °F (36.4 °C) (Axillary)   Resp 16   Ht 5' 1\" (1.549 m)   Wt 146 lb 1.6 oz (66.3 kg)   SpO2 95%   BMI 27.61 kg/m²   I/O last 3 completed shifts: In: 600 [P.O.:600]  Out: 850 [Urine:850]  No intake/output data recorded.     Physical Exam:  General: AAO to person/place/time/purpose, NAD, no labored breathing  Eyes: conjunctivae/corneas clear, sclera non icteric  Skin: color/texture/turgor normal, no rashes or lesions  Lungs: CTAB, no retractions/use of accessory muscles, no vocal fremitus, no rhonchi, no crackle, no rales  Heart: regular rate, regular rhythm, no murmur  Abdomen: soft, NT; bowel sounds normal; no masses,  no organomegaly  Extremities: Bulla-like lesion noted on patient's right inner upper arm which is seeping, noted post surgical changes to the left hip, left arm is in a sling secondary to humeral fracture, there is some ecchymosis overlying patient's left shoulder from her fall. 2+ bilateral pitting edema present in lower extremities. Neurologic: cranial nerves 2-12 grossly intact, no slurred speech. Most Recent Labs  Lab Results   Component Value Date    WBC 6.2 04/18/2019    HGB 9.1 (L) 04/18/2019    HCT 31.2 (L) 04/18/2019     04/18/2019     04/18/2019    K 3.8 04/18/2019     04/18/2019    CREATININE 0.6 04/18/2019    BUN 17 04/18/2019    CO2 23 04/18/2019    GLUCOSE 86 04/18/2019    ALT 14 04/18/2019    AST 33 (H) 04/18/2019    INR 1.7 04/17/2019    TSH 2.170 04/15/2019       FLUORO FOR SURGICAL PROCEDURES   Final Result   The detail of the report with intraoperative report. XR SHOULDER LEFT (MIN 2 VIEWS)   Final Result      Impacted fracture involving left humeral neck. XR HIP 2-3 VW W PELVIS LEFT   Final Result      Intertrochanteric left hip fracture. XR CHEST PORTABLE   Final Result      1. Limited examination due to rotation. 2. Opacities overlying left costophrenic sulcus which may be related   to overlying soft tissue summation. Right lower lobe pneumonia,   atelectasis, or effusion is possible. Repeat chest radiograph may be   warranted for further evaluation. CT Head WO Contrast   Final Result      1. No evidence of acute intracranial hemorrhage or edema. 2. Chronic areas of stroke are seen involving right and left   cerebellar hemispheres. CT Facial Bones WO Contrast   Final Result      1. Mildly displaced fracture involving right nasal bone of uncertain   chronicity. 2. Globes and orbits are intact. CT Cervical Spine WO Contrast   Final Result   1. Degenerative changes are seen predominant in the facet joints at   C3-4 more on the left than on the right. 2. Central posterior disc extrusion seen at C3-4 and C4-5.      3. Findings compatible with diffuse idiopathic skeletal hyperostosis   as commented. 4. No acute fractures seen in the cervical spine. BLOOD CX #1  No results for input(s): BC in the last 72 hours. BLOOD CX #2  No results for input(s): Kenny Scott in the last 72 hours. TIP CULTURE  No results for input(s): CXCATHTIP in the last 72 hours. CULTURE, RESPIRATORY   No results for input(s): CULTRESP in the last 72 hours. RESPIRATORY SMEAR  No results for input(s): RESPSMEAR in the last 72 hours. BODY FLUID CULTURE  No results for input(s): BFCS in the last 72 hours. WOUND ABSCESS  No results for input(s): WNDABS in the last 72 hours. Anaerobic cx  No results for input(s): LABANAE in the last 72 hours. CULTURE SURGICAL  No results for input(s): CXSURG in the last 72 hours. URINE CULTURE  No results for input(s): LABURIN in the last 72 hours. No results for input(s): ORG in the last 72 hours. MISC. SENDOUT  No results for input(s): MREF in the last 72 hours. STREP PNEUMONIA AG URINE  No results for input(s): STREPNEUMAGU in the last 72 hours. LEGIONELLA AG URINE  No results for input(s): LEGUR in the last 72 hours. No results for input(s): [address] in the last 72 hours.       Assessment   Active Hospital Problems    Diagnosis    Closed intertrochanteric fracture of left femur, initial encounter [facility]) [S72.142A]     Priority: High    Fx humeral neck, left, closed, initial encounter [S42.212A]     Priority: Medium    Chronic diastolic CHF (congestive heart failure) (HCC) [I50.32]     Priority: Medium    Chronic anticoagulation [Z79.01]     Priority: Medium    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]     Priority: Medium    Primary adenocarcinoma of ascending colon ([facility] [C18.2]     Priority: Medium    Anemia due to blood loss [D50.0]     Priority: Medium    Thalassemia trait [D56.3]     Priority: Medium    S/P IVC filter [Y72.986]     Priority: Medium    H/o Duodenal ulcer [K26.9]     Priority: Medium    History of peptic ulcer disease [Z87.11]     Priority: Medium    History of pulmonary embolism [Z86.711]     Priority: Medium    Chronic deep vein thrombosis (DVT) of tibial vein of right lower extremity (HCC) [I82.541]     Priority: Medium    Chronic pulmonary embolism (HCC) [I27.82]     Priority: Medium    Persistent atrial fibrillation (HCC) [I48.1]     Priority: Medium    Colon cancer ([facility] [C18.9]    CAD (coronary artery disease) [I25.10]    Chronic deep vein thrombosis (DVT) of femoral vein of left lower extremity (HCC) [I82.512]    Hypertension [I10]    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 Post-op pain control per ortho, PT/OT 6/24. Benefits outweighed risks to have surgery-- this was discussed w/ the . · Afib w/ RVR: Cardiology following-- defer rate/rhythm control meds to cardiology. TSH. Restarted Eliquis. · Seeping Bulla right upper extremity: conservative management. May need wrapped  · Chronic DVT/PE-IVC filter removed: Eliquis. · Acute on chronic diastolic CHF: Change Lasix to Demadex. · H/o colon cancer/severe protein calorie malnutrition: She was under hospice care at home-- can follow after SNF  · Klebsiella UTI (POA): UA noted. Urine cx noted. IV Ancef completed. · DC glasgow   · Add Reglan for nausea  · Continue home medications. · Follow labs  · DVT prophylaxis.   · Please see orders for further management and care. ·  for discharge planning  · Discharge plan: SNF when bed availabe and pre-cert obtained     Electronically signed by Sary Sarkar DO on 4/18/2019 at 8:58 AM    Hospital Cell (always forwarded to covering physician): [phone]. I can be reached through [employer] as well.

## 2019-04-18 NOTE — PROGRESS NOTES
Occupational Therapy  OT BEDSIDE TREATMENT NOTE      Date:2019  Patient Name: Ernestina Ramirez  MRN: [id number]  : 1940  Room: [address] [place] [address]     Per OT Eval:     Evaluating OT: Timoteo ADAN/EMERY [id number]     AM-PAC Daily Activity Raw Score:      Recommended Adaptive Equipment: TBD     Diagnosis: L femur fracture, L humeral neck fracture. Pt presents to ED post fall at home.   Surgery: 4/15 L hip IM nailing   Pertinent Medical History: breast CA, CAD, colon CA, h/o R shoulder arthroplasty in Oct 2017  Precautions: falls, 50% weight bearing L LE, NWB L UE, sling L UE, bed/chair alarms     Home Living: Pt is a questionable historian. Pt lives with  in a mobile home with a ramp on entry. Bathroom setup: walk in shower with seat and grab bars      Prior Level of Function: Mod I with ADLs, Mod I with IADLs; completed functional mobility no AD but has [employer]     Pain Level: L UE and L LE- Moderate  Cognition: Patient alert and oriented.     Functional Assessment:    Initial Eval Status  Date: 19 Treatment Session:   19 Short Term Goals  Treatment frequency: 2-5x/wk PRN x1-3 wks   Feeding Min A  Set up in armchair for breakfast       Grooming Max A   Min A   UB Dressing Max A  Including management of L sling providing hospital gown as a layer between sling and skin  Max A - to doff/don sling. Educated pt on proper positioning of sling Min A   LB Dressing Dependent   Max A    Bathing Max A   Mod A   Toileting Dependent   Mod A x1-2   Bed Mobility  Supine to sit: Dependent  Supine-to-Sit: Dependent x2     Functional Transfers STS: Dependent  SPT: Dependent to armchair  Sit-to-Stand: Dependent x2 from EOB requiring max vc for safety and to avoid using L UE.  SPT: Dependent x2 from EOB to bedside chair (with arm rest raised) to maximize safety due to pt demoing difficulty maintaining standing position. Mod A x1-2   Functional Mobility NT  Not assessed.  Max A with LRD during ADLs   Balance Sitting: initially poor able to achieve poor plus with sitting x4-5 min     Standing: poor Sitting: Poor exhibiting posterior lean  Standing: Poor without AD     Activity Tolerance poor Poor due to pain and fatigue. standing aria x3-4 min with poor plus balance during self care tasks                         L UE Exercises: Therapist facilitated PROM L elbow, wrist and digit flexion and extension 2x10 reps. Pt declined pain. Exercises were performed to prevent stiffness and increase participation during future ADLs. Comments: Upon arrival pt was supine in bed. At end of session pt was transferred to chair with B LE supported alarm on and all lines and tubes intact, call light within reach. Education: Safe transfer training, UE positioning while seated/ supine and proper technique to don sling all to improve overall independence with future self care tasks. · Pt has made fair progress towards set goals.    · Continue with current plan of care      Total Tx Time: [address] STACY/EMERY [id number]

## 2019-04-18 NOTE — PLAN OF CARE
Problem: Pain:  Goal: Pain level will decrease  Description  Pain level will decrease  4/18/2019 0904 by Susan Francisco RN  Outcome: Met This Shift  4/18/2019 0206 by Nata Abbasi RN  Outcome: Met This Shift  Goal: Control of acute pain  Description  Control of acute pain  4/18/2019 0904 by Susan Francisco RN  Outcome: Met This Shift  4/18/2019 0206 by Nata Abbasi RN  Outcome: Met This Shift  Goal: Control of chronic pain  Description  Control of chronic pain  4/18/2019 0904 by Susan Francisco RN  Outcome: Met This Shift  4/18/2019 0206 by Nata Abbasi RN  Outcome: Met This Shift     Problem: Risk for Impaired Skin Integrity  Goal: Tissue integrity - skin and mucous membranes  Description  Structural intactness and normal physiological function of skin and  mucous membranes. 4/18/2019 0904 by Susan Francisco RN  Outcome: Met This Shift  4/18/2019 0206 by Nata Abbasi RN  Outcome: Met This Shift     Problem: Falls - Risk of:  Goal: Will remain free from falls  Description  Will remain free from falls  4/18/2019 0904 by Susan Francisco RN  Outcome: Met This Shift  4/18/2019 0206 by Nata Abbasi RN  Outcome: Met This Shift  Goal: Absence of physical injury  Description  Absence of physical injury  4/18/2019 0904 by Susan Francisco RN  Outcome: Met This Shift  4/18/2019 0206 by Nata Abbasi RN  Outcome: Met This Shift     Problem:  Activity:  Goal: Ability to tolerate increased activity will improve  Description  Ability to tolerate increased activity will improve  4/18/2019 0904 by Susan Francisco RN  Outcome: Met This Shift  4/18/2019 0206 by Nata Abbasi RN  Outcome: Met This Shift     Problem: Cardiac:  Goal: Ability to maintain vital signs within normal range will improve  Description  Ability to maintain vital signs within normal range will improve  4/18/2019 0904 by Susan Francisco RN  Outcome: Met This Shift  4/18/2019 0206 by Nata Abbasi RN  Outcome: Met This Shift  Goal: Cardiovascular alteration will improve  Description  Cardiovascular alteration will improve  4/18/2019 0904 by Mando Randolph RN  Outcome: Met This Shift  4/18/2019 0206 by Lencho Solis RN  Outcome: Met This Shift

## 2019-04-18 NOTE — CARE COORDINATION
Social Work:    Received word from [address] this a.m. advising that patient  needs a 3- day stay, after discontinuation of hospice, in order to transfer to [address] [place] [address]. Social service met with Mrs. Lester Figueroa and spoke with her  via phone. Both MrBrodie & Mrs. Lester Figueroa are active with [facility] at home. Mrs. Lester Figueroa was ambulating with a wheeled walker prior to a fall and surgery. Patient & spouse advised that they discontinued [facility] upon admission and Mrs. Lester Figueroa desires to have skilled rehab at [address] [place] [address] in order to regain prior mobility. Social work spoke with  Carmen at [address] [place] [address] who advised that because Mrs. Lester Figueroa is discharging from hospice care and going skilled, her insurance will be Medicare until the first of the month, when it will switch to [address] [place] [place] [address] also advised that she was told patient did not discontinue care with hospice as until yesterday, which is incorrect. Social work updated  Priyank who updated verification. [facility] verification department confirmed accuracy of Medicare not [place] [facility] at this time. Social work called Blackmon Angelofili at [facility] and requested she fax proof of discontinuation of care upon admission to [facility] be sent to Renee at [address] [place] [address]. Social work arranged [facility] ambulance to transfer Mrs. Zimmer to [address] [place] [address] today 3:00 p.m. Patient, spouse, and liaison Arcadio Lyman, are all aware.       Electronically signed by YADIEL Rondon on 4/18/2019 at 12:51 PM

## 2019-05-28 ENCOUNTER — TELEPHONE (OUTPATIENT)
Dept: PHARMACY | Facility: CLINIC | Age: 79
End: 2019-05-28

## 2019-05-28 NOTE — TELEPHONE ENCOUNTER
Component Value Date    CALCIUM 7.5 (L) 04/18/2019    PHOS 3.8 02/09/2018     estimated creatinine clearance is 67 mL/min (based on SCr of 0.6 mg/dL). No previous DEXA scan ordered. Assessment:   - NOF recommends BMD testing in adults who have a fracture at or after age 48; USPSTF and ACP recommend DEXA for women at or after age 72  · 67 y.o. female with recent fracture and would benefit from DEXA to assess current BMD  - NOF recommends to initiate pharmacologic treatment in those with hip or vertebral fracture    Considerations:  - Patient is entering hospice care. Will not pursue recommending DEXA or osteoporosis therapy at this time.     Julius Fisher, PharmD  PGY1 Pharmacy Resident  [address] [facility]  Telephone: [phone] ext. [phone]    .CLINICAL PHARMACY CONSULT: MED RECONCILIATION/REVIEW ADDENDUM    For Pharmacy Admin Tracking Only    PHSO: Yes  Total # of Interventions Recommended: 0  - Maintenance Safety Lab Monitoring #: 1  - New Therapy Lab Monitoring #: 1  Total Interventions Accepted: 0  Time Spent (min): Natasha Vasquez, PharmD  [address]

## (undated) DEVICE — SPONGE GZ W4XL4IN RAYON POLY FILL CVR W/ NONWOVEN FAB

## (undated) DEVICE — GUIDEWIRE ORTH L400MM DIA3.2MM FOR TFN

## (undated) DEVICE — BANDAGE,GAUZE,BULKEE II,4.5"X4.1YD,STRL: Brand: MEDLINE

## (undated) DEVICE — BIT DRL L L266MM DIA16MM FEM FLX CANN QUIK CPL

## (undated) DEVICE — GRADUATE TRIANG MEASURE 1000ML BLK PRNT

## (undated) DEVICE — GAUZE,SPONGE,4"X4",8PLY,STRL,LF,10/TRAY: Brand: MEDLINE

## (undated) DEVICE — Device

## (undated) DEVICE — PATIENT RETURN ELECTRODE, SINGLE-USE, CONTACT QUALITY MONITORING, ADULT, WITH 9FT CORD, FOR PATIENTS WEIGING OVER 33LBS. (15KG): Brand: MEGADYNE

## (undated) DEVICE — INTENDED FOR TISSUE SEPARATION, AND OTHER PROCEDURES THAT REQUIRE A SHARP SURGICAL BLADE TO PUNCTURE OR CUT.: Brand: BARD-PARKER ® STAINLESS STEEL BLADES

## (undated) DEVICE — TOWEL,OR,DSP,ST,BLUE,STD,6/PK,12PK/CS: Brand: MEDLINE

## (undated) DEVICE — SOLUTION IV IRRIG POUR BRL 0.9% SODIUM CHL 2F7124

## (undated) DEVICE — 3M™ IOBAN™ 2 ANTIMICROBIAL INCISE DRAPE 6650EZ: Brand: IOBAN™ 2

## (undated) DEVICE — PAD PERINL POST FOAM DISPOSABLE FOR AMSCO SURG TBL

## (undated) DEVICE — SOLUTION IV IRRIG WATER 1000ML POUR BRL 2F7114

## (undated) DEVICE — STANDARD HYPODERMIC NEEDLE,POLYPROPYLENE HUB: Brand: MONOJECT

## (undated) DEVICE — DRESSING,GAUZE,XEROFORM,CURAD,1"X8",ST: Brand: CURAD

## (undated) DEVICE — PACK PROCEDURE SURG GEN CUST

## (undated) DEVICE — BIT DRL L500MM DIA6X9MM CANN STP L QUIK CPL FOR DH DC TFN

## (undated) DEVICE — CHLORAPREP 26ML ORANGE

## (undated) DEVICE — COVER HNDL LT DISP

## (undated) DEVICE — 4-PORT MANIFOLD: Brand: NEPTUNE 2

## (undated) DEVICE — DRAPE C ARM W41XL74IN UNIV MOB W RUBBERBAND CLP

## (undated) DEVICE — SUTURE VCRL SZ 2-0 L27IN ABSRB UD L26MM SH 1/2 CIR J417H

## (undated) DEVICE — DRESSING,GAUZE,PETROLATUM,CURAD,1"X8",ST: Brand: CURAD

## (undated) DEVICE — DRAPE PATIENT ISOL IRRIG POUCH

## (undated) DEVICE — TAPE ADH W3INXL10YD WHT COT WVN BK POWERFUL RUB BASE HIGHLY

## (undated) DEVICE — TUBING, SUCTION, 9/32" X 10', STRAIGHT: Brand: MEDLINE

## (undated) DEVICE — DRESSING COMP W4XL4IN N ADH PD W2.5XL2.5IN GZ BORDERED ADH

## (undated) DEVICE — BIT DRL L330MM DIA4.2MM CALIB L100MM ST 3 FLUT QUIK CPL FOR

## (undated) DEVICE — STERILE HOOK LOCK LATEX FREE ELASTIC BANDAGE 4INX5YD: Brand: HOOK LOCK™

## (undated) DEVICE — KIT SURG PREP POVIDONE IOD PRESATURATED PAINT WET FOR UNIV

## (undated) DEVICE — DOUBLE BASIN SET: Brand: MEDLINE INDUSTRIES, INC.

## (undated) DEVICE — GOWN,SIRUS,FABRNF,L,20/CS: Brand: MEDLINE